# Patient Record
Sex: FEMALE | Race: WHITE | NOT HISPANIC OR LATINO | URBAN - METROPOLITAN AREA
[De-identification: names, ages, dates, MRNs, and addresses within clinical notes are randomized per-mention and may not be internally consistent; named-entity substitution may affect disease eponyms.]

---

## 2023-09-05 PROBLEM — N94.6 DYSMENORRHEA: Status: ACTIVE | Noted: 2023-09-05

## 2023-09-05 PROBLEM — N92.0 MENORRHAGIA: Status: ACTIVE | Noted: 2023-09-05

## 2023-09-05 PROBLEM — F41.9 ANXIETY: Status: ACTIVE | Noted: 2023-09-05

## 2023-09-05 PROBLEM — R06.00 DYSPNEA: Status: ACTIVE | Noted: 2023-09-05

## 2023-09-05 PROBLEM — F90.9 ADULT ATTENTION DEFICIT HYPERACTIVITY DISORDER: Status: ACTIVE | Noted: 2023-09-05

## 2023-09-05 RX ORDER — FLUOXETINE HYDROCHLORIDE 40 MG/1
CAPSULE ORAL
COMMUNITY

## 2023-09-05 RX ORDER — DEXTROAMPHETAMINE SACCHARATE, AMPHETAMINE ASPARTATE MONOHYDRATE, DEXTROAMPHETAMINE SULFATE AND AMPHETAMINE SULFATE 6.25; 6.25; 6.25; 6.25 MG/1; MG/1; MG/1; MG/1
CAPSULE, EXTENDED RELEASE ORAL
COMMUNITY
End: 2023-11-28 | Stop reason: SDUPTHER

## 2023-09-05 RX ORDER — AZITHROMYCIN 250 MG/1
TABLET, FILM COATED ORAL
COMMUNITY
End: 2023-10-23 | Stop reason: ALTCHOICE

## 2023-09-05 RX ORDER — TRAZODONE HYDROCHLORIDE 50 MG/1
TABLET ORAL
COMMUNITY
End: 2024-01-24

## 2023-09-05 RX ORDER — QUETIAPINE 150 MG/1
TABLET, FILM COATED, EXTENDED RELEASE ORAL
COMMUNITY
End: 2024-04-04 | Stop reason: ALTCHOICE

## 2023-09-05 RX ORDER — LAMOTRIGINE 100 MG/1
TABLET ORAL
COMMUNITY
End: 2024-01-24

## 2023-09-05 RX ORDER — ALPRAZOLAM 0.5 MG/1
TABLET ORAL
COMMUNITY
End: 2024-04-04 | Stop reason: ALTCHOICE

## 2023-09-05 RX ORDER — AMOXICILLIN AND CLAVULANATE POTASSIUM 875; 125 MG/1; MG/1
TABLET, FILM COATED ORAL
COMMUNITY
End: 2023-10-23 | Stop reason: ALTCHOICE

## 2023-10-23 ENCOUNTER — TELEMEDICINE (OUTPATIENT)
Dept: PRIMARY CARE | Facility: CLINIC | Age: 53
End: 2023-10-23
Payer: COMMERCIAL

## 2023-10-23 ENCOUNTER — TELEPHONE (OUTPATIENT)
Dept: PRIMARY CARE | Facility: CLINIC | Age: 53
End: 2023-10-23

## 2023-10-23 DIAGNOSIS — U07.1 COVID-19: Primary | ICD-10-CM

## 2023-10-23 PROCEDURE — 99213 OFFICE O/P EST LOW 20 MIN: CPT | Performed by: FAMILY MEDICINE

## 2023-10-23 RX ORDER — EPINEPHRINE 0.3 MG/.3ML
1 INJECTION INTRAMUSCULAR ONCE AS NEEDED
COMMUNITY
Start: 2023-06-02

## 2023-10-23 RX ORDER — NIRMATRELVIR AND RITONAVIR 300-100 MG
3 KIT ORAL 2 TIMES DAILY
Qty: 30 TABLET | Refills: 0 | Status: SHIPPED | OUTPATIENT
Start: 2023-10-23 | End: 2023-10-28

## 2023-10-23 ASSESSMENT — ENCOUNTER SYMPTOMS
COUGH: 1
HEADACHES: 1
WHEEZING: 1
SINUS PAIN: 1

## 2023-10-23 NOTE — TELEPHONE ENCOUNTER
Ruth called as she test + for COVID this morning. She was sent the [employer] link to set up for possible virtual appt.

## 2023-10-23 NOTE — TELEPHONE ENCOUNTER
Ruth called back and agrees to the end of day for telev. She did say she has concerns with the chest tightness as she had history of blood clots. She did say that she is prone to having respiratory infections as well.

## 2023-10-23 NOTE — LETTER
Date: 2023  RE:  Ruth Gonzalez  :  1970      To Whom It May Concern:    Our patient, Ruth, has been under our care and now may return back to work without restrictions.    Their return to work date is:  ***    If you have questions concerning this patient's immediate care, please feel free to contact our office at [phone]    Sincerely,      Huseyin Santos DO

## 2023-10-23 NOTE — PROGRESS NOTES
Subjective   Patient ID: Ruth Gonzalez is a 52 y.o. adult who presents for Covid-19 Home Monitoring Visit.    URI   This is a new problem. The current episode started yesterday. The problem has been gradually worsening (Tested positive for COVID this am). Maximum temperature: Feverish. Associated symptoms include congestion, coughing, headaches, sinus pain and wheezing. Ruth Gonzalez has tried inhaler use and acetaminophen for the symptoms. The treatment provided mild relief.        Review of Systems   HENT:  Positive for congestion and sinus pain.    Respiratory:  Positive for cough and wheezing.    Neurological:  Positive for headaches.       Objective   There were no vitals taken for this visit.    Physical Exam  Vitals reviewed: No physical - telehealth visit.         Assessment/Plan   Diagnoses and all orders for this visit:  COVID-19  -     nirmatrelvir-ritonavir (Paxlovid) 300 mg (150 mg x 2)-100 mg tablet therapy pack; Take 3 tablets by mouth 2 times a day for 5 days. Follow the instructions on the package

## 2023-10-24 ENCOUNTER — APPOINTMENT (OUTPATIENT)
Dept: RADIOLOGY | Facility: HOSPITAL | Age: 53
End: 2023-10-24
Payer: COMMERCIAL

## 2023-10-24 ENCOUNTER — HOSPITAL ENCOUNTER (EMERGENCY)
Facility: HOSPITAL | Age: 53
Discharge: HOME | End: 2023-10-24
Attending: EMERGENCY MEDICINE
Payer: COMMERCIAL

## 2023-10-24 ENCOUNTER — TELEPHONE (OUTPATIENT)
Dept: PRIMARY CARE | Facility: CLINIC | Age: 53
End: 2023-10-24
Payer: COMMERCIAL

## 2023-10-24 VITALS
WEIGHT: 190.7 LBS | DIASTOLIC BLOOD PRESSURE: 76 MMHG | OXYGEN SATURATION: 98 % | TEMPERATURE: 98.2 F | BODY MASS INDEX: 28.24 KG/M2 | HEIGHT: 69 IN | SYSTOLIC BLOOD PRESSURE: 125 MMHG | HEART RATE: 85 BPM | RESPIRATION RATE: 15 BRPM

## 2023-10-24 DIAGNOSIS — U07.1 COVID-19 VIRUS INFECTION: Primary | ICD-10-CM

## 2023-10-24 LAB
ALBUMIN SERPL-MCNC: 4.2 G/DL (ref 3.5–5)
ALP BLD-CCNC: 118 U/L (ref 35–125)
ALT SERPL-CCNC: 21 U/L (ref 5–40)
ANION GAP SERPL CALC-SCNC: 14 MMOL/L
APPEARANCE UR: CLEAR
AST SERPL-CCNC: 28 U/L (ref 5–40)
BACTERIA #/AREA URNS AUTO: ABNORMAL /HPF
BILIRUB DIRECT SERPL-MCNC: <0.2 MG/DL (ref 0–0.2)
BILIRUB SERPL-MCNC: 0.2 MG/DL (ref 0.1–1.2)
BILIRUB UR STRIP.AUTO-MCNC: NEGATIVE MG/DL
BUN SERPL-MCNC: 9 MG/DL (ref 8–25)
CALCIUM SERPL-MCNC: 8.9 MG/DL (ref 8.5–10.4)
CHLORIDE SERPL-SCNC: 100 MMOL/L (ref 97–107)
CO2 SERPL-SCNC: 20 MMOL/L (ref 24–31)
COLOR UR: ABNORMAL
CREAT SERPL-MCNC: 0.8 MG/DL (ref 0.4–1.6)
D DIMER PPP FEU-MCNC: 0.53 MG/L FEU (ref 0.19–0.5)
ERYTHROCYTE [DISTWIDTH] IN BLOOD BY AUTOMATED COUNT: 12.9 % (ref 11.5–14.5)
GFR SERPL CREATININE-BSD FRML MDRD: 90 ML/MIN/1.73M*2
GLUCOSE SERPL-MCNC: 108 MG/DL (ref 65–99)
GLUCOSE UR STRIP.AUTO-MCNC: NORMAL MG/DL
HCT VFR BLD AUTO: 39.1 % (ref 36–52)
HGB BLD-MCNC: 13.7 G/DL (ref 12–17.5)
KETONES UR STRIP.AUTO-MCNC: NEGATIVE MG/DL
LEUKOCYTE ESTERASE UR QL STRIP.AUTO: NEGATIVE
MCH RBC QN AUTO: 32.4 PG (ref 26–34)
MCHC RBC AUTO-ENTMCNC: 35 G/DL (ref 32–36)
MCV RBC AUTO: 92 FL (ref 80–100)
NITRITE UR QL STRIP.AUTO: NEGATIVE
NRBC BLD-RTO: 0 /100 WBCS (ref 0–0)
PH UR STRIP.AUTO: 5 [PH]
PLATELET # BLD AUTO: 275 X10*3/UL (ref 150–450)
PMV BLD AUTO: 10.4 FL (ref 7.5–11.5)
POTASSIUM SERPL-SCNC: 3.8 MMOL/L (ref 3.4–5.1)
PROT SERPL-MCNC: 7.6 G/DL (ref 5.9–7.9)
PROT UR STRIP.AUTO-MCNC: NEGATIVE MG/DL
RBC # BLD AUTO: 4.23 X10*6/UL (ref 4–5.9)
RBC # UR STRIP.AUTO: ABNORMAL /UL
RBC #/AREA URNS AUTO: ABNORMAL /HPF
SODIUM SERPL-SCNC: 134 MMOL/L (ref 133–145)
SP GR UR STRIP.AUTO: 1.01
SQUAMOUS #/AREA URNS AUTO: ABNORMAL /HPF
TROPONIN T SERPL-MCNC: <6 NG/L
UROBILINOGEN UR STRIP.AUTO-MCNC: NORMAL MG/DL
WBC # BLD AUTO: 3.6 X10*3/UL (ref 4.4–11.3)
WBC #/AREA URNS AUTO: ABNORMAL /HPF

## 2023-10-24 PROCEDURE — 2500000004 HC RX 250 GENERAL PHARMACY W/ HCPCS (ALT 636 FOR OP/ED): Performed by: EMERGENCY MEDICINE

## 2023-10-24 PROCEDURE — 81001 URINALYSIS AUTO W/SCOPE: CPT | Performed by: EMERGENCY MEDICINE

## 2023-10-24 PROCEDURE — 85300 ANTITHROMBIN III ACTIVITY: CPT | Performed by: EMERGENCY MEDICINE

## 2023-10-24 PROCEDURE — 96374 THER/PROPH/DIAG INJ IV PUSH: CPT | Mod: XU

## 2023-10-24 PROCEDURE — 82248 BILIRUBIN DIRECT: CPT | Performed by: EMERGENCY MEDICINE

## 2023-10-24 PROCEDURE — 99285 EMERGENCY DEPT VISIT HI MDM: CPT | Mod: 25

## 2023-10-24 PROCEDURE — 71045 X-RAY EXAM CHEST 1 VIEW: CPT | Mod: FY

## 2023-10-24 PROCEDURE — 96375 TX/PRO/DX INJ NEW DRUG ADDON: CPT | Mod: XU

## 2023-10-24 PROCEDURE — 2550000001 HC RX 255 CONTRASTS: Performed by: EMERGENCY MEDICINE

## 2023-10-24 PROCEDURE — 84484 ASSAY OF TROPONIN QUANT: CPT | Performed by: EMERGENCY MEDICINE

## 2023-10-24 PROCEDURE — 36415 COLL VENOUS BLD VENIPUNCTURE: CPT | Performed by: EMERGENCY MEDICINE

## 2023-10-24 PROCEDURE — 80053 COMPREHEN METABOLIC PANEL: CPT | Performed by: EMERGENCY MEDICINE

## 2023-10-24 PROCEDURE — 85027 COMPLETE CBC AUTOMATED: CPT | Performed by: EMERGENCY MEDICINE

## 2023-10-24 PROCEDURE — 71275 CT ANGIOGRAPHY CHEST: CPT

## 2023-10-24 RX ORDER — ONDANSETRON HYDROCHLORIDE 2 MG/ML
4 INJECTION, SOLUTION INTRAVENOUS EVERY 6 HOURS PRN
Status: COMPLETED | OUTPATIENT
Start: 2023-10-24 | End: 2023-10-24

## 2023-10-24 RX ORDER — ONDANSETRON 4 MG/1
4 TABLET, FILM COATED ORAL EVERY 6 HOURS
Qty: 12 TABLET | Refills: 0 | Status: SHIPPED | OUTPATIENT
Start: 2023-10-24 | End: 2023-10-27

## 2023-10-24 RX ORDER — KETOROLAC TROMETHAMINE 30 MG/ML
15 INJECTION, SOLUTION INTRAMUSCULAR; INTRAVENOUS ONCE
Status: COMPLETED | OUTPATIENT
Start: 2023-10-24 | End: 2023-10-24

## 2023-10-24 RX ORDER — BENZONATATE 100 MG/1
100 CAPSULE ORAL EVERY 8 HOURS
Qty: 21 CAPSULE | Refills: 0 | Status: SHIPPED | OUTPATIENT
Start: 2023-10-24 | End: 2023-10-31

## 2023-10-24 RX ORDER — ALBUTEROL SULFATE 90 UG/1
1-2 AEROSOL, METERED RESPIRATORY (INHALATION) EVERY 6 HOURS PRN
Qty: 18 G | Refills: 0 | Status: SHIPPED | OUTPATIENT
Start: 2023-10-24 | End: 2023-12-29 | Stop reason: WASHOUT

## 2023-10-24 RX ADMIN — SODIUM CHLORIDE 1000 ML: 900 INJECTION, SOLUTION INTRAVENOUS at 14:59

## 2023-10-24 RX ADMIN — KETOROLAC TROMETHAMINE 15 MG: 30 INJECTION, SOLUTION INTRAMUSCULAR at 15:04

## 2023-10-24 RX ADMIN — ONDANSETRON 4 MG: 2 INJECTION INTRAMUSCULAR; INTRAVENOUS at 15:02

## 2023-10-24 RX ADMIN — IOHEXOL 75 ML: 350 INJECTION, SOLUTION INTRAVENOUS at 17:02

## 2023-10-24 ASSESSMENT — PAIN SCALES - GENERAL
PAINLEVEL_OUTOF10: 7
PAINLEVEL_OUTOF10: 7

## 2023-10-24 ASSESSMENT — PAIN - FUNCTIONAL ASSESSMENT: PAIN_FUNCTIONAL_ASSESSMENT: 0-10

## 2023-10-24 ASSESSMENT — COLUMBIA-SUICIDE SEVERITY RATING SCALE - C-SSRS
2. HAVE YOU ACTUALLY HAD ANY THOUGHTS OF KILLING YOURSELF?: NO
6. HAVE YOU EVER DONE ANYTHING, STARTED TO DO ANYTHING, OR PREPARED TO DO ANYTHING TO END YOUR LIFE?: NO
1. IN THE PAST MONTH, HAVE YOU WISHED YOU WERE DEAD OR WISHED YOU COULD GO TO SLEEP AND NOT WAKE UP?: NO

## 2023-10-24 NOTE — ED PROVIDER NOTES
EMERGENCY DEPARTMENT ENCOUNTER      [ ] CODE STEMI [ ] CODE Neuro [ ] CODE Yellow [ ] Modified Trauma [ ] CODE Blue      CHIEF COMPLAINT      Chief Complaint   Patient presents with    Shortness of Breath     Pt states she tested positive for covid yesterday.  Has been feeling sob, chest stabbing pain and cough starting Saturday.  States hx of PE.        Mode of Arrival:  Primary Care Provider: Huseyin Santos DO  Medical Record Number: [id number]      History obtained by: Patient  Limited by nothing  Time seen: 3:55 PM    QUALITY MEASURES   PPE Utilized: N95 with goggles and gloves      HPI      Ruth Gonzalez is a 52 y.o. adult with a history of COVID vaccination x2, anxiety, depression, prior DVT for which she was on Eliquis for 3 months about 2 years ago unknown cause, comes to the emergency room stating that about 3 days ago she started to experience generalized symptoms of cough sore throat fatigue malaise low appetite and cough as well as chills so went to her primary care doctor yesterday where she received Paxlovid which she started taking.  However felt some mild left lower chest aches as well as left lower abdominal aches and was concerned as she states her prior PE had felt like this so wanted to get checked out as a precaution.  Otherwise has taken Tylenol with some fever relief no other medications taken.  No other complaints.      Patient otherwise denies fever,   n/v/d, shortness of breath,   rhinorrhea,  dysuria, hematuria, swollen extremities or skin changes, hematemesis, hematochezia, melena or any other accompanying symptoms of late.      The patient has no other complaints at this time.               PAST MEDICAL HISTORY    History reviewed. No pertinent past medical history.      I have personally reviewed the patient's past medical history in the records.  Kristofer Rome MD    SURGICAL HISTORY    Past Surgical History:   Procedure Laterality Date    BREAST SURGERY      HYSTERECTOMY      WISDOM  TOOTH EXTRACTION         I have personally reviewed the patient's past surgical history in the records.  Kristofer Rome MD    CURRENT MEDICATIONS    I have reviewed the patient’s medications.   Please see nursing and pharmacy records for the most up to date list.     [unfilled]    ALLERGIES    Allergies   Allergen Reactions    Other Other     Zolpidem Tartrate; complex sleep behaviors     Zolpidem Tartrate Other       I have personally reviewed the patient's past history of allergies in the records.  Kristofer Rome MD    FAMILY HISTORY    Family History   Problem Relation Name Age of Onset    Hypertension Mother      Other (RAD) Mother      Diabetes Mother      Depression Mother      Asthma Mother      Mental illness Mother      No Known Problems Son      Heart disease Maternal Grandmother      Stroke Maternal Grandmother      Diabetes Maternal Grandmother      Diabetes Maternal Grandfather      Breast cancer Paternal Grandmother      Cancer Paternal Grandmother      Cancer Paternal Grandfather      Lymphoma Paternal Grandfather         I have personally reviewed the patient's family history in the records.  Kristofer Rome MD    SOCIAL HISTORY    Social History     Socioeconomic History    Marital status:      Spouse name: None    Number of children: None    Years of education: None    Highest education level: None   Occupational History    None   Tobacco Use    Smoking status: Never    Smokeless tobacco: Never   Substance and Sexual Activity    Alcohol use: None    Drug use: None    Sexual activity: None   Other Topics Concern    None   Social History Narrative    None     Social Determinants of Health     Financial Resource Strain: Not on file   Food Insecurity: Not on file   Transportation Needs: Not on file   Physical Activity: Not on file   Stress: Not on file   Social Connections: Not on file   Intimate Partner Violence: Not on file   Housing Stability: Not on file         I have personally reviewed the  "patient's social history in the records.  Kristofer Rome MD    REVIEW OF SYSTEMS      14 point ROS was reviewed and negative except as noted above in HPI.      PHYSICAL EXAM    VITAL SIGNS:    /89 (BP Location: Left arm, Patient Position: Lying)   Pulse 81   Temp 37.3 °C (99.1 °F)   Resp 15   Ht 1.753 m (5' 9\")   Wt 86.5 kg (190 lb 11.2 oz)   SpO2 95%   BMI 28.16 kg/m²    Review EMR for vital signs  Nursing note and vitals reviewed.    Constitutional:  Alert and oriented, well-developed, well-nourished, appears stated age, non-toxic appearing  HENT:  Normocephalic, atraumatic, bilateral external ears normal, oropharynx moist, Nose normal.  Neck: normal range of motion, no tenderness, supple, no stridor.  Eyes:  PERRL, EOMI, conjunctiva normal, no discharge.   Cardiovascular:  Normal heart rate, normal rhythm, no murmurs, no rubs, no gallops.   Respiratory:  Normal breath sounds, no respiratory distress, no wheezing, no chest wall tenderness.   GI:  Bowel sounds normal, soft, no tenderness, no rebound or guarding, no distention, no masses pulsatile or otherwise   (any female  exam was done with female chaperone present):   Deferred  Integument:  Warm, dry, no erythema, no rash, no edema.   Back:  No midline tenderness, no CVA tenderness.   Musculoskeletal:  Intact distal pulses, no tenderness, no cyanosis, no clubbing, with capillary refill less than 2 seconds. Good range of motion in all major joints. No tenderness to palpation or major deformities noted.    Neurologic:  Alert & oriented x 3, normal motor function, normal sensory function, no focal deficits noted. Cranial nerves II-XII intact.  Psychiatric:  Affect normal, judgment normal, mood normal.     EKG  Performed at   1448 , HR of  98 ,     NSR, NAD, no sign of STEMI or NSTEMI, no Q wave or T wave abnormality noted.    Reviewed and interpreted by me at time performed           Reviewed and interpreted by me, Kristofer Rome MD        CARDIAC " MONITORING    Cardiac monitor reveals normal sinus rhythm as interpreted by me.   Cardiac monitor was ordered secondary to patient's history of chest pain/palpitations/near-syncope/syncope/sob/stroke and to monitor the patient for dysrhythmia.      RADIOLOGY  XR chest 1 view   Final Result   No evidence of acute cardiopulmonary process.             MACRO:   None        Signed by: Lawrence Heckfreddy 10/24/2023 3:26 PM   Dictation workstation:   [id number]      CT angio chest for pulmonary embolism    (Results Pending)       All Imaging studies evaluated and interpreted by ED physician except when noted otherwise.    ED PROVIDER INTERPRETATION (XRAYS ONLY):       *I have interpreted the x-ray real-time in the ED myself, and made a clinical decision on it prior to the formal radiology reading.    Kristofer Rome M.D.    RADIOLOGIST IMPRESSION (U/S, CT, MRI):   XR chest 1 view   Final Result   No evidence of acute cardiopulmonary process.             MACRO:   None        Signed by: Lawrence Anyu 10/24/2023 3:26 PM   Dictation workstation:   [id number]      CT angio chest for pulmonary embolism    (Results Pending)         PERTINENT LABS    Please refer to the chart for all lab work and to MDM for relevant discussion.      PROCEDURE    None (procdoc)    ED COURSE & MEDICAL DECISION MAKING    Pertinent Labs & Imaging studies reviewed. (See chart for details)    MDM:    Assessment: Ruth Gonzalez is a 52 y.o.adult who presents to the ED with suspected sequelae of COVID infection and told patient that for now I would give a combination of Toradol Zofran and IV fluid for her relief but also obtain a basic cardiac work-up CBC chemistry troponin chest x-ray LFTs also check a urinalysis given the patient has some self described left lower quadrant pain although there is no tenderness on exam suspect that this is simply muscle aches.  However also had a D-dimer per patient's concern and low threshold for obtaining a CT angio of the chest  "given her prior PE.  Patient understands and agrees with plan.  Not currently on blood thinners        Prior records in EPIC reviewed by me.     2023 Coding Requirements:  --Independent historian(s):    see HPI  --Review of prior records:    EHR reviewed   --Relevant comorbidities:    see records  --Social determinants of health:          I have considered the following conditions in my assessment of this patient's   cough: Bronchitis, bronchiolitis, pneumonia, bronchospasm/asthma, croup,   pertussis, COPD, irritant cough (ie.  post-nasal drip, fume inhalation, allergens),   medication side effect (ie ACE inhibitors), viral upper respiratory infection,   influenza, pulmonary embolism, congestive heart failure, pulmonary edema,   GERD, foreign body aspiration/obstruction, malignancy.      Ultimately CBC shows slightly low white count consistent with COVID infection.  CBC chemistry LFTs unremarkable with troponin negative and UA only showing bacteria but no other sign of infection.  However D-dimer borderline elevated so decision made to obtain a CT angio of the chest despite normal chest x-ray.  Patient understands and agrees with plan and otherwise feels better    CT angio of the chest did not show any acute findings aside from stable lung nodules compared to prior and after discussion patient will provide albuterol Zofran and Tessalon to additionally help with COVID infection.  Patient understands and agrees with plan        ED VITALS  Vitals:    10/24/23 1418 10/24/23 1547   BP: 139/52 113/89   BP Location:  Left arm   Patient Position:  Lying   Pulse: 104 81   Resp: 20 15   Temp: 37.3 °C (99.1 °F)    SpO2: 98% 95%   Weight: 86.5 kg (190 lb 11.2 oz)    Height: 1.753 m (5' 9\")        BP  Min: 113/89  Max: 139/52    As part of the 2022 MIPS reporting requirements, the following measures have been reviewed and documented:    None     1. COVID-19 virus infection          DISPOSITION       DISCHARGE.  The patient is " discharged back to their place of residence.  Discharge diagnosis, instructions and plan were discussed and understood. At the time of discharge the patient was comfortable and was in no apparent distress. Patient is aware of diagnostic uncertainty and was notified though testing is negative here, there is a very small chance that pathology may be missed.  The patient understands these risks and the patient /family understood to return immediately to the emergency department if the symptoms worsen or if they have any additional concerns.    DISCHARGE MEDICATIONS  New Prescriptions    No medications on file       FOLLOW UP  No follow-up provider specified.    Kristofer Rome    This note was created with the assistance of voice recognition technology.  While attempts were made to ensure accuracy, mis-transcription may be present due to limitations in the software.        Electronically signed by MD Kristofer Dos Santos MD  10/24/23 9595

## 2023-10-24 NOTE — DISCHARGE INSTRUCTIONS
Use ibuprofen and Tylenol for any fever headaches muscle aches Tessalon to help with any cough and sore throat albuterol to also help with cough and Zofran can be used for any nausea.  If your symptoms worsen spite medication use and Paxlovid, you may come back to the emergency room for repeat evaluation.

## 2023-10-24 NOTE — LETTER
October 24, 2023    Patient: Ruth Gonzalez   YOB: 1970   Date of Visit: 10/24/2023       To Whom It May Concern:    Ruth Gonzalez was seen and treated in our emergency department on 10/24/2023. Ruth Gonzalez may come back to work on October 27, 2023    If you have any questions or concerns, please don't hesitate to call.      Kristofer Rome MD

## 2023-10-24 NOTE — TELEPHONE ENCOUNTER
Pt is calling back to follow up with. She states she's COVID + positve 10/23/23 (home test kit). She is still getting the stabbing pain in her chest and L side. She used the heating pad last night it has not done anything. She now has throbbing soreness in her both legs. When she inhales she gets the pain in her back and side. The Albuterol inhaler ran out last night. She's getting SOB. Fever 100.4, pulse ox on her apple watch is 94 % not shadia how accurate these are. OTC: Tylenol, She's working from home and did talk to work and she'd have to use FMLA, since she exhausted all her sick days. Please advise and call her [phone].

## 2023-10-25 ENCOUNTER — TELEPHONE (OUTPATIENT)
Dept: PRIMARY CARE | Facility: CLINIC | Age: 53
End: 2023-10-25
Payer: COMMERCIAL

## 2023-10-25 NOTE — TELEPHONE ENCOUNTER
Spoke with patient and she states that she would like to have FMLA forms filled out for her COVID + and her trip to the ER for the blood clots. She states that she has no more sick days at work and they are telling her that she could come back now and just wear a mask. She states that she is currently taking the paxlovid and would like to have her FMLA filled out since they are giving her a hard time. She states that they no longer have any COVID protocols in place. She works at [employer] and she is going to have them fax the forms to you. She states taht she also had her vacation already planned before getting COVID and she would like to know if he RTW date could be 11/06/23. She is afraid that they will not allow her to have her vacation since being off for her COVID. Please call her back with any questions at [phone]. PL

## 2023-10-27 NOTE — TELEPHONE ENCOUNTER
Pt is calling back to follow up on the receipt of her [facility] paperwork. Please call [phone].

## 2023-10-27 NOTE — TELEPHONE ENCOUNTER
Called patient and let her know that we did not receive paperwork. Explained that we had been having fax issues. She is going to have it refaxed.

## 2023-10-30 NOTE — TELEPHONE ENCOUNTER
Ruth called to check on her [facility] papers, and we still have not received them, so she will be bringing them up for Dr Santos to fill out next week.

## 2023-11-01 ENCOUNTER — HOSPITAL ENCOUNTER (OUTPATIENT)
Dept: CARDIOLOGY | Facility: HOSPITAL | Age: 53
Discharge: HOME | End: 2023-11-01
Payer: COMMERCIAL

## 2023-11-01 PROCEDURE — 93005 ELECTROCARDIOGRAM TRACING: CPT

## 2023-11-01 NOTE — TELEPHONE ENCOUNTER
Ruth stopped in the office and dropped off her [place] paperwork for [facility]. I thoroughly explained Dr. Santos is out of office this week and if needing any signatures from him or statements he would not be able to until he gets back from vacation. Patient brought back in additional labs and notes she had at home. ER note sent with labs and recent Telemedicine note. I advised patient in detail I was unsure if [facility] would accept these but I would fax to them today. Patient is visibly upset due to work, paycheck and time off that is needed. I again advised her that we would send what we could without Dr. Santos being here and let her know of any outcome. Faxed to [facility] [phone] successful fax confirmation.

## 2023-11-01 NOTE — TELEPHONE ENCOUNTER
Ruth called this morning asking on the status of the FMLA forms. She was asked/ informed that it was noted she was to drop them off next week. She stated that is not what she said, as she was COVID + and could not leave. She had took  her last dose of Paxlovid on Monday 10-30. She is still testing positive. She was offered my work e-mail to have her employer to send it over being it was not received by fax.

## 2023-11-06 ENCOUNTER — TELEMEDICINE (OUTPATIENT)
Dept: PRIMARY CARE | Facility: CLINIC | Age: 53
End: 2023-11-06
Payer: COMMERCIAL

## 2023-11-06 DIAGNOSIS — J20.9 ACUTE BRONCHITIS, UNSPECIFIED ORGANISM: Primary | ICD-10-CM

## 2023-11-06 PROCEDURE — 99213 OFFICE O/P EST LOW 20 MIN: CPT | Performed by: FAMILY MEDICINE

## 2023-11-06 RX ORDER — PROMETHAZINE HYDROCHLORIDE AND DEXTROMETHORPHAN HYDROBROMIDE 6.25; 15 MG/5ML; MG/5ML
5 SYRUP ORAL EVERY 4 HOURS PRN
Qty: 120 ML | Refills: 0 | Status: SHIPPED | OUTPATIENT
Start: 2023-11-06 | End: 2023-12-06

## 2023-11-06 RX ORDER — PREDNISONE 10 MG/1
TABLET ORAL
Qty: 21 TABLET | Refills: 0 | Status: SHIPPED | OUTPATIENT
Start: 2023-11-06 | End: 2023-11-15

## 2023-11-06 RX ORDER — AMOXICILLIN AND CLAVULANATE POTASSIUM 875; 125 MG/1; MG/1
875 TABLET, FILM COATED ORAL 2 TIMES DAILY
Qty: 20 TABLET | Refills: 0 | Status: SHIPPED | OUTPATIENT
Start: 2023-11-06 | End: 2023-11-16

## 2023-11-06 NOTE — PROGRESS NOTES
Subjective   Patient ID: Ruth Gonzalez is a 52 y.o. adult who presents for Follow up COVID.    Spoke to patient via telehealth.  Pt has covid.  Went to ER - negative blood work.  Neg CT for PE or pneumonia.      Still fatigued, coughing, low grade fever (100 degrees).  Pt does get SOB with walking, exertion.  Pt has albuterol.  Using tessalon perles.     Pt needs paperwork for FMLA.           Review of Systems    Objective   There were no vitals taken for this visit.    Physical Exam  Vitals reviewed: Telehealth visit.         Assessment/Plan   Diagnoses and all orders for this visit:  Acute bronchitis, unspecified organism  -     predniSONE (Deltasone) 10 mg tablet; Take 4 tablets (40 mg) by mouth once daily for 3 days, THEN 2 tablets (20 mg) once daily for 3 days, THEN 1 tablet (10 mg) once daily for 3 days.  -     amoxicillin-pot clavulanate (Augmentin) 875-125 mg tablet; Take 1 tablet (875 mg) by mouth 2 times a day for 10 days.  -     promethazine-DM (Phenergan-DM) 6.25-15 mg/5 mL syrup; Take 5 mL by mouth every 4 hours if needed for cough.

## 2023-11-07 ENCOUNTER — TELEPHONE (OUTPATIENT)
Dept: PRIMARY CARE | Facility: CLINIC | Age: 53
End: 2023-11-07
Payer: COMMERCIAL

## 2023-11-07 NOTE — TELEPHONE ENCOUNTER
Pt called again, and said she needs the paperwork by the 9th, and would like a call with an update!

## 2023-11-09 ENCOUNTER — TELEPHONE (OUTPATIENT)
Dept: PRIMARY CARE | Facility: CLINIC | Age: 53
End: 2023-11-09
Payer: COMMERCIAL

## 2023-11-27 ENCOUNTER — PATIENT MESSAGE (OUTPATIENT)
Dept: PRIMARY CARE | Facility: CLINIC | Age: 53
End: 2023-11-27
Payer: COMMERCIAL

## 2023-11-27 DIAGNOSIS — F90.9 ADULT ATTENTION DEFICIT HYPERACTIVITY DISORDER: Primary | ICD-10-CM

## 2023-11-27 DIAGNOSIS — F41.9 ANXIETY: Primary | ICD-10-CM

## 2023-11-27 RX ORDER — QUETIAPINE FUMARATE 100 MG/1
100 TABLET, FILM COATED ORAL NIGHTLY
Qty: 90 TABLET | Refills: 1 | Status: SHIPPED | OUTPATIENT
Start: 2023-11-27 | End: 2024-05-30

## 2023-11-28 RX ORDER — DEXTROAMPHETAMINE SACCHARATE, AMPHETAMINE ASPARTATE MONOHYDRATE, DEXTROAMPHETAMINE SULFATE AND AMPHETAMINE SULFATE 6.25; 6.25; 6.25; 6.25 MG/1; MG/1; MG/1; MG/1
25 CAPSULE, EXTENDED RELEASE ORAL EVERY MORNING
Qty: 30 CAPSULE | Refills: 0 | Status: SHIPPED | OUTPATIENT
Start: 2023-11-28 | End: 2023-12-29 | Stop reason: SDUPTHER

## 2023-12-28 PROBLEM — N92.0 MENORRHAGIA: Status: RESOLVED | Noted: 2023-09-05 | Resolved: 2023-12-28

## 2023-12-28 PROBLEM — R06.00 DYSPNEA: Status: RESOLVED | Noted: 2023-09-05 | Resolved: 2023-12-28

## 2023-12-28 PROBLEM — N94.6 DYSMENORRHEA: Status: RESOLVED | Noted: 2023-09-05 | Resolved: 2023-12-28

## 2023-12-29 ENCOUNTER — OFFICE VISIT (OUTPATIENT)
Dept: PRIMARY CARE | Facility: CLINIC | Age: 53
End: 2023-12-29
Payer: COMMERCIAL

## 2023-12-29 VITALS
DIASTOLIC BLOOD PRESSURE: 72 MMHG | SYSTOLIC BLOOD PRESSURE: 126 MMHG | BODY MASS INDEX: 29.03 KG/M2 | HEIGHT: 69 IN | TEMPERATURE: 97.5 F | HEART RATE: 72 BPM | OXYGEN SATURATION: 98 % | RESPIRATION RATE: 18 BRPM | WEIGHT: 196 LBS

## 2023-12-29 DIAGNOSIS — F90.9 ADULT ATTENTION DEFICIT HYPERACTIVITY DISORDER: ICD-10-CM

## 2023-12-29 DIAGNOSIS — F41.9 ANXIETY: ICD-10-CM

## 2023-12-29 DIAGNOSIS — Z00.00 ROUTINE GENERAL MEDICAL EXAMINATION AT A HEALTH CARE FACILITY: Primary | ICD-10-CM

## 2023-12-29 DIAGNOSIS — R05.8 POST-VIRAL COUGH SYNDROME: ICD-10-CM

## 2023-12-29 DIAGNOSIS — Z12.31 VISIT FOR SCREENING MAMMOGRAM: ICD-10-CM

## 2023-12-29 DIAGNOSIS — Z12.11 SCREEN FOR COLON CANCER: ICD-10-CM

## 2023-12-29 PROCEDURE — 99396 PREV VISIT EST AGE 40-64: CPT | Performed by: FAMILY MEDICINE

## 2023-12-29 PROCEDURE — 1036F TOBACCO NON-USER: CPT | Performed by: FAMILY MEDICINE

## 2023-12-29 RX ORDER — FLUTICASONE FUROATE AND VILANTEROL 100; 25 UG/1; UG/1
1 POWDER RESPIRATORY (INHALATION) DAILY
Qty: 1 EACH | Refills: 1 | Status: SHIPPED | OUTPATIENT
Start: 2023-12-29

## 2023-12-29 RX ORDER — DEXTROAMPHETAMINE SACCHARATE, AMPHETAMINE ASPARTATE MONOHYDRATE, DEXTROAMPHETAMINE SULFATE AND AMPHETAMINE SULFATE 6.25; 6.25; 6.25; 6.25 MG/1; MG/1; MG/1; MG/1
25 CAPSULE, EXTENDED RELEASE ORAL EVERY MORNING
Qty: 30 CAPSULE | Refills: 0 | Status: SHIPPED | OUTPATIENT
Start: 2023-12-29 | End: 2024-02-26 | Stop reason: SDUPTHER

## 2023-12-29 ASSESSMENT — COLUMBIA-SUICIDE SEVERITY RATING SCALE - C-SSRS
6. HAVE YOU EVER DONE ANYTHING, STARTED TO DO ANYTHING, OR PREPARED TO DO ANYTHING TO END YOUR LIFE?: NO
1. IN THE PAST MONTH, HAVE YOU WISHED YOU WERE DEAD OR WISHED YOU COULD GO TO SLEEP AND NOT WAKE UP?: NO
2. HAVE YOU ACTUALLY HAD ANY THOUGHTS OF KILLING YOURSELF?: NO

## 2023-12-29 ASSESSMENT — PATIENT HEALTH QUESTIONNAIRE - PHQ9
2. FEELING DOWN, DEPRESSED OR HOPELESS: NOT AT ALL
SUM OF ALL RESPONSES TO PHQ9 QUESTIONS 1 AND 2: 0
1. LITTLE INTEREST OR PLEASURE IN DOING THINGS: NOT AT ALL

## 2023-12-29 ASSESSMENT — ENCOUNTER SYMPTOMS
OCCASIONAL FEELINGS OF UNSTEADINESS: 0
DEPRESSION: 0
LOSS OF SENSATION IN FEET: 0

## 2023-12-29 NOTE — PATIENT INSTRUCTIONS
For physical - check blood work.  Order for cologuard and mammogram.     For ADHD - refill adderall xr.  Sent to [facility].      For cough - recommend breo.  Likely post viral cough from covid.  Use once a day - rinse mouth after.     For anxiety - stable on current regimen.     Follow up in 3 months.

## 2023-12-29 NOTE — PROGRESS NOTES
"Subjective   Patient ID: Ruth Gonzalez is a 53 y.o. adult who presents for Annual Exam.    ADHD:  -F/U: stable.  Medication working well.  Tolerating well  -Side Effects: none  -Past medications:   -Other:    Anxiety  -F/U: having lots of stress at work.    -Symptoms have been stable    -She denies current suicidal and homicidal ideation.    -Current Treatment:  -Counseling:  -Previous treatment includes:     Cough  -Pt had Covid - still has cough.  Not improving.  Tried rescue inhaler - no benefit.            Review of Systems    Objective   /72   Pulse 72   Temp 36.4 °C (97.5 °F)   Resp 18   Ht 1.753 m (5' 9\")   Wt 88.9 kg (196 lb)   SpO2 98%   BMI 28.94 kg/m²     Physical Exam  Vitals reviewed.   Constitutional:       General: Ruth Gonzalez is not in acute distress.  Cardiovascular:      Rate and Rhythm: Normal rate and regular rhythm.   Pulmonary:      Effort: Pulmonary effort is normal.      Breath sounds: No wheezing or rhonchi.   Musculoskeletal:      Right lower leg: No edema.      Left lower leg: No edema.   Lymphadenopathy:      Cervical: No cervical adenopathy.   Neurological:      Mental Status: Ruth Gonzalez is alert.       An OARRS report was printed and I have personally reviewed the results.  The report will be scanned into the health record.  I have considered the risk of abuse, dependance, addiction, and diversion.  I believe it is clinically appropriate for this patient to continue taking this medication.      Assessment/Plan   Diagnoses and all orders for this visit:  Routine general medical examination at a health care facility  Adult attention deficit hyperactivity disorder  Anxiety    Patient Instructions   For physical - check blood work.  Order for cologuard and mammogram.     For ADHD - refill adderall xr.  Sent to [facility].      For cough - recommend breo.  Likely post viral cough from covid.  Use once a day - rinse mouth after.     For anxiety - stable on " current regimen.     Follow up in 3 months.

## 2024-01-24 DIAGNOSIS — F32.A ANXIETY AND DEPRESSION: Primary | ICD-10-CM

## 2024-01-24 DIAGNOSIS — Z00.00 ENCOUNTER FOR GENERAL ADULT MEDICAL EXAMINATION WITHOUT ABNORMAL FINDINGS: ICD-10-CM

## 2024-01-24 DIAGNOSIS — F41.9 ANXIETY AND DEPRESSION: Primary | ICD-10-CM

## 2024-01-24 DIAGNOSIS — F51.04 PSYCHOPHYSIOLOGICAL INSOMNIA: Primary | ICD-10-CM

## 2024-01-24 RX ORDER — ESTRADIOL 2 MG/1
2 TABLET ORAL DAILY
Qty: 90 TABLET | Refills: 3 | Status: SHIPPED | OUTPATIENT
Start: 2024-01-24

## 2024-01-24 RX ORDER — QUETIAPINE FUMARATE 50 MG/1
TABLET, FILM COATED ORAL
Qty: 90 TABLET | Refills: 2 | Status: SHIPPED | OUTPATIENT
Start: 2024-01-24

## 2024-01-24 RX ORDER — ALPRAZOLAM 1 MG/1
TABLET ORAL
Qty: 30 TABLET | Refills: 2 | Status: SHIPPED | OUTPATIENT
Start: 2024-01-24

## 2024-01-24 RX ORDER — TRAZODONE HYDROCHLORIDE 50 MG/1
50-100 TABLET ORAL NIGHTLY PRN
Qty: 180 TABLET | Refills: 2 | Status: SHIPPED | OUTPATIENT
Start: 2024-01-24

## 2024-01-24 RX ORDER — LAMOTRIGINE 100 MG/1
TABLET ORAL DAILY
Qty: 90 TABLET | Refills: 2 | Status: SHIPPED | OUTPATIENT
Start: 2024-01-24

## 2024-02-26 DIAGNOSIS — F90.9 ADULT ATTENTION DEFICIT HYPERACTIVITY DISORDER: ICD-10-CM

## 2024-02-26 RX ORDER — DEXTROAMPHETAMINE SACCHARATE, AMPHETAMINE ASPARTATE MONOHYDRATE, DEXTROAMPHETAMINE SULFATE AND AMPHETAMINE SULFATE 6.25; 6.25; 6.25; 6.25 MG/1; MG/1; MG/1; MG/1
25 CAPSULE, EXTENDED RELEASE ORAL EVERY MORNING
Qty: 30 CAPSULE | Refills: 0 | Status: SHIPPED | OUTPATIENT
Start: 2024-02-26 | End: 2024-02-29 | Stop reason: SDUPTHER

## 2024-02-26 NOTE — TELEPHONE ENCOUNTER
Requested Prescriptions     Pending Prescriptions Disp Refills    amphetamine-dextroamphetamine XR (Adderall XR) 25 mg 24 hr capsule 30 capsule 0     Sig: Take 1 capsule (25 mg) by mouth once daily in the morning.

## 2024-02-26 NOTE — TELEPHONE ENCOUNTER
Refill:      amphetamine-dextroamphetamine XR (Adderall XR) 25 mg 24 hr capsule Take 1 capsule (25 mg) by mouth once daily in the morning.             Pharm:  [facility]

## 2024-02-28 NOTE — TELEPHONE ENCOUNTER
Ruth called as GE, Lisa still does not have her refill as the system is still down. She is asking if you could call.

## 2024-02-29 RX ORDER — DEXTROAMPHETAMINE SACCHARATE, AMPHETAMINE ASPARTATE MONOHYDRATE, DEXTROAMPHETAMINE SULFATE AND AMPHETAMINE SULFATE 6.25; 6.25; 6.25; 6.25 MG/1; MG/1; MG/1; MG/1
25 CAPSULE, EXTENDED RELEASE ORAL EVERY MORNING
Qty: 30 CAPSULE | Refills: 0 | Status: SHIPPED | OUTPATIENT
Start: 2024-02-29

## 2024-04-04 ENCOUNTER — OFFICE VISIT (OUTPATIENT)
Dept: PRIMARY CARE | Facility: CLINIC | Age: 54
End: 2024-04-04
Payer: COMMERCIAL

## 2024-04-04 VITALS
HEART RATE: 64 BPM | DIASTOLIC BLOOD PRESSURE: 70 MMHG | OXYGEN SATURATION: 99 % | BODY MASS INDEX: 28.47 KG/M2 | WEIGHT: 192.8 LBS | SYSTOLIC BLOOD PRESSURE: 104 MMHG

## 2024-04-04 DIAGNOSIS — F41.9 ANXIETY: Primary | ICD-10-CM

## 2024-04-04 DIAGNOSIS — F90.9 ADULT ATTENTION DEFICIT HYPERACTIVITY DISORDER: ICD-10-CM

## 2024-04-04 DIAGNOSIS — G44.219 EPISODIC TENSION-TYPE HEADACHE, NOT INTRACTABLE: ICD-10-CM

## 2024-04-04 DIAGNOSIS — R55 VASOVAGAL SYNCOPE: ICD-10-CM

## 2024-04-04 PROCEDURE — 1036F TOBACCO NON-USER: CPT | Performed by: FAMILY MEDICINE

## 2024-04-04 PROCEDURE — 99213 OFFICE O/P EST LOW 20 MIN: CPT | Performed by: FAMILY MEDICINE

## 2024-04-04 RX ORDER — DEXTROAMPHETAMINE SACCHARATE, AMPHETAMINE ASPARTATE MONOHYDRATE, DEXTROAMPHETAMINE SULFATE AND AMPHETAMINE SULFATE 6.25; 6.25; 6.25; 6.25 MG/1; MG/1; MG/1; MG/1
25 CAPSULE, EXTENDED RELEASE ORAL EVERY MORNING
Qty: 30 CAPSULE | Refills: 0 | Status: SHIPPED | OUTPATIENT
Start: 2024-06-03 | End: 2024-07-03

## 2024-04-04 RX ORDER — DEXTROAMPHETAMINE SACCHARATE, AMPHETAMINE ASPARTATE MONOHYDRATE, DEXTROAMPHETAMINE SULFATE AND AMPHETAMINE SULFATE 6.25; 6.25; 6.25; 6.25 MG/1; MG/1; MG/1; MG/1
25 CAPSULE, EXTENDED RELEASE ORAL EVERY MORNING
Qty: 30 CAPSULE | Refills: 0 | Status: SHIPPED | OUTPATIENT
Start: 2024-04-04 | End: 2024-05-04

## 2024-04-04 RX ORDER — DEXTROAMPHETAMINE SACCHARATE, AMPHETAMINE ASPARTATE MONOHYDRATE, DEXTROAMPHETAMINE SULFATE AND AMPHETAMINE SULFATE 6.25; 6.25; 6.25; 6.25 MG/1; MG/1; MG/1; MG/1
25 CAPSULE, EXTENDED RELEASE ORAL EVERY MORNING
Qty: 30 CAPSULE | Refills: 0 | Status: SHIPPED | OUTPATIENT
Start: 2024-05-04 | End: 2024-06-03

## 2024-04-04 ASSESSMENT — PATIENT HEALTH QUESTIONNAIRE - PHQ9
2. FEELING DOWN, DEPRESSED OR HOPELESS: NOT AT ALL
1. LITTLE INTEREST OR PLEASURE IN DOING THINGS: NOT AT ALL
SUM OF ALL RESPONSES TO PHQ9 QUESTIONS 1 & 2: 0

## 2024-04-04 ASSESSMENT — LIFESTYLE VARIABLES
SKIP TO QUESTIONS 9-10: 1
HOW MANY STANDARD DRINKS CONTAINING ALCOHOL DO YOU HAVE ON A TYPICAL DAY: PATIENT DOES NOT DRINK
AUDIT-C TOTAL SCORE: 0
HOW OFTEN DO YOU HAVE A DRINK CONTAINING ALCOHOL: NEVER
HOW OFTEN DO YOU HAVE SIX OR MORE DRINKS ON ONE OCCASION: NEVER

## 2024-04-04 ASSESSMENT — ENCOUNTER SYMPTOMS
DEPRESSION: 0
OCCASIONAL FEELINGS OF UNSTEADINESS: 0
LOSS OF SENSATION IN FEET: 0

## 2024-04-04 ASSESSMENT — PAIN SCALES - GENERAL: PAINLEVEL: 4

## 2024-04-04 NOTE — LETTER
April 4, 2024     Patient: Ruth Gonzalez   YOB: 1970   Date of Visit: 4/4/2024       To Whom It May Concern:    Ruth Gonzalez was seen in my clinic on 4/4/2024 at 10:45 am. Please excuse Ruth for Ruth Gonzalez's absence from work on this day to make the appointment.    If you have any questions or concerns, please don't hesitate to call.         Sincerely,         Huseyin Santos, DO        CC: No Recipients

## 2024-04-04 NOTE — PATIENT INSTRUCTIONS
For ADHD - continue adderall.  Medication contract filled out.     For mood/anxiety - continue lamictal, fluoxetine, and seroquel.     For syncopal episodes - likely vasovagal - if another episode we will do work up.     For tension type headache - if not improved we will do physical therapy/massotherapy or chiropractor manipulation     Follow up in 3 months.

## 2024-04-04 NOTE — PROGRESS NOTES
"Subjective   Patient ID: Ruth Gonzalez is a 53 y.o. adult who presents for Anxiety.    ADHD:  -F/U: stable.  ADHD has been manageable.  Issues with getting adderall - now able to get it.  Does well with adderall.  Tolerating well.    -Side Effects: none  -Past medications:   -Other:    Anxiety  -F/U: Pt has been stressed.  Mother is in hospital.  Causing increased stress.  Work is stressful.    -Symptoms have been stable    -She denies current suicidal and homicidal ideation.    -Current Treatment: lamictal, seroquel, fluoxetine, xanan sparingly  -Counseling: not currently enrolled.   -Previous treatment includes:     Headache:  -Type of headache: Tension Type Headache  -Symptoms: Frontal and occipital.  Worsening.  Pt had episode - pt was finishing bathroom - pt stood up - pt felt \"weird\" and then passed out.  Patient was not confused after episode.  No other episodes  -Frequency:  couple times a week.    -Associated symptoms: light sensitivity, no sound sensitivity  -Treatment: excedrin migraine  -Specialist:  -Past Medications:          Anxiety             Review of Systems    Objective   /70 (BP Location: Right arm, Patient Position: Sitting)   Pulse 64   Wt 87.5 kg (192 lb 12.8 oz)   SpO2 99%   BMI 28.47 kg/m²     Physical Exam  Vitals reviewed.   Constitutional:       General: Rtuh Gonzalez is not in acute distress.  Cardiovascular:      Rate and Rhythm: Normal rate and regular rhythm.   Pulmonary:      Effort: Pulmonary effort is normal.      Breath sounds: No wheezing or rhonchi.   Musculoskeletal:      Right lower leg: No edema.      Left lower leg: No edema.   Lymphadenopathy:      Cervical: No cervical adenopathy.   Neurological:      Mental Status: Ruth Gonzalez is alert.         Assessment/Plan   Diagnoses and all orders for this visit:  Anxiety  Adult attention deficit hyperactivity disorder  -     amphetamine-dextroamphetamine XR (Adderall XR) 25 mg 24 hr capsule; Take 1 capsule " (25 mg) by mouth once daily in the morning. Do not crush or chew.  -     amphetamine-dextroamphetamine XR (Adderall XR) 25 mg 24 hr capsule; Take 1 capsule (25 mg) by mouth once daily in the morning. Do not crush or chew. Do not start before May 4, 2024.  -     amphetamine-dextroamphetamine XR (Adderall XR) 25 mg 24 hr capsule; Take 1 capsule (25 mg) by mouth once daily in the morning. Do not crush or chew. Do not start before Anabel 3, 2024.  Vasovagal syncope  Episodic tension-type headache, not intractable         Patient Instructions   For ADHD - continue adderall.  Medication contract filled out.     For mood/anxiety - continue lamictal, fluoxetine, and seroquel.     For syncopal episodes - likely vasovagal - if another episode we will do work up.     For tension type headache - if not improved we will do physical therapy/massotherapy or chiropractor manipulation     Follow up in 3 months.

## 2024-05-30 DIAGNOSIS — F41.9 ANXIETY: ICD-10-CM

## 2024-05-30 RX ORDER — QUETIAPINE FUMARATE 100 MG/1
100 TABLET, FILM COATED ORAL NIGHTLY
Qty: 90 TABLET | Refills: 1 | Status: SHIPPED | OUTPATIENT
Start: 2024-05-30

## 2024-07-05 ENCOUNTER — APPOINTMENT (OUTPATIENT)
Dept: PRIMARY CARE | Facility: CLINIC | Age: 54
End: 2024-07-05
Payer: COMMERCIAL

## 2024-07-27 DIAGNOSIS — Z00.00 ENCOUNTER FOR GENERAL ADULT MEDICAL EXAMINATION WITHOUT ABNORMAL FINDINGS: ICD-10-CM

## 2024-07-28 RX ORDER — ALPRAZOLAM 1 MG/1
TABLET ORAL
Qty: 30 TABLET | Refills: 1 | Status: SHIPPED | OUTPATIENT
Start: 2024-07-28

## 2024-08-12 ENCOUNTER — APPOINTMENT (OUTPATIENT)
Dept: PRIMARY CARE | Facility: CLINIC | Age: 54
End: 2024-08-12
Payer: COMMERCIAL

## 2024-08-19 ENCOUNTER — HOSPITAL ENCOUNTER (EMERGENCY)
Facility: HOSPITAL | Age: 54
Discharge: HOME | End: 2024-08-19
Attending: STUDENT IN AN ORGANIZED HEALTH CARE EDUCATION/TRAINING PROGRAM
Payer: COMMERCIAL

## 2024-08-19 ENCOUNTER — APPOINTMENT (OUTPATIENT)
Dept: RADIOLOGY | Facility: HOSPITAL | Age: 54
End: 2024-08-19
Payer: COMMERCIAL

## 2024-08-19 VITALS
WEIGHT: 180 LBS | DIASTOLIC BLOOD PRESSURE: 78 MMHG | SYSTOLIC BLOOD PRESSURE: 135 MMHG | OXYGEN SATURATION: 97 % | TEMPERATURE: 97.5 F | HEART RATE: 82 BPM | BODY MASS INDEX: 26.66 KG/M2 | HEIGHT: 69 IN | RESPIRATION RATE: 16 BRPM

## 2024-08-19 DIAGNOSIS — R07.89 CHEST WALL PAIN: Primary | ICD-10-CM

## 2024-08-19 DIAGNOSIS — R21 RASH AND NONSPECIFIC SKIN ERUPTION: ICD-10-CM

## 2024-08-19 LAB
ALBUMIN SERPL-MCNC: 4.1 G/DL (ref 3.5–5)
ALP BLD-CCNC: 98 U/L (ref 35–125)
ALT SERPL-CCNC: 12 U/L (ref 5–40)
ANION GAP SERPL CALC-SCNC: 12 MMOL/L
AST SERPL-CCNC: 15 U/L (ref 5–40)
BASOPHILS # BLD AUTO: 0.02 X10*3/UL (ref 0–0.1)
BASOPHILS NFR BLD AUTO: 0.4 %
BILIRUB SERPL-MCNC: 0.3 MG/DL (ref 0.1–1.2)
BUN SERPL-MCNC: 10 MG/DL (ref 8–25)
CALCIUM SERPL-MCNC: 9.2 MG/DL (ref 8.5–10.4)
CHLORIDE SERPL-SCNC: 104 MMOL/L (ref 97–107)
CO2 SERPL-SCNC: 22 MMOL/L (ref 24–31)
CREAT SERPL-MCNC: 0.8 MG/DL (ref 0.4–1.6)
EGFRCR SERPLBLD CKD-EPI 2021: 89 ML/MIN/1.73M*2
EOSINOPHIL # BLD AUTO: 0.21 X10*3/UL (ref 0–0.7)
EOSINOPHIL NFR BLD AUTO: 4 %
ERYTHROCYTE [DISTWIDTH] IN BLOOD BY AUTOMATED COUNT: 12.5 % (ref 11.5–14.5)
GLUCOSE SERPL-MCNC: 89 MG/DL (ref 65–99)
HCT VFR BLD AUTO: 40.6 % (ref 36–52)
HGB BLD-MCNC: 13.9 G/DL (ref 12–17.5)
IMM GRANULOCYTES # BLD AUTO: 0.01 X10*3/UL (ref 0–0.7)
IMM GRANULOCYTES NFR BLD AUTO: 0.2 % (ref 0–0.9)
LYMPHOCYTES # BLD AUTO: 1.28 X10*3/UL (ref 1.2–4.8)
LYMPHOCYTES NFR BLD AUTO: 24.1 %
MCH RBC QN AUTO: 32 PG (ref 26–34)
MCHC RBC AUTO-ENTMCNC: 34.2 G/DL (ref 32–36)
MCV RBC AUTO: 93 FL (ref 80–100)
MONOCYTES # BLD AUTO: 0.36 X10*3/UL (ref 0.1–1)
MONOCYTES NFR BLD AUTO: 6.8 %
NEUTROPHILS # BLD AUTO: 3.43 X10*3/UL (ref 1.2–7.7)
NEUTROPHILS NFR BLD AUTO: 64.5 %
NRBC BLD-RTO: 0 /100 WBCS (ref 0–0)
PLATELET # BLD AUTO: 306 X10*3/UL (ref 150–450)
POTASSIUM SERPL-SCNC: 3.9 MMOL/L (ref 3.4–5.1)
PROT SERPL-MCNC: 7.6 G/DL (ref 5.9–7.9)
RBC # BLD AUTO: 4.35 X10*6/UL (ref 4–5.9)
SARS-COV-2 RNA RESP QL NAA+PROBE: NOT DETECTED
SODIUM SERPL-SCNC: 138 MMOL/L (ref 133–145)
TROPONIN T SERPL-MCNC: <6 NG/L
TROPONIN T SERPL-MCNC: <6 NG/L
WBC # BLD AUTO: 5.3 X10*3/UL (ref 4.4–11.3)

## 2024-08-19 PROCEDURE — 71275 CT ANGIOGRAPHY CHEST: CPT | Performed by: RADIOLOGY

## 2024-08-19 PROCEDURE — 99284 EMERGENCY DEPT VISIT MOD MDM: CPT | Mod: 25

## 2024-08-19 PROCEDURE — 80053 COMPREHEN METABOLIC PANEL: CPT | Performed by: PHYSICIAN ASSISTANT

## 2024-08-19 PROCEDURE — 84484 ASSAY OF TROPONIN QUANT: CPT | Performed by: PHYSICIAN ASSISTANT

## 2024-08-19 PROCEDURE — 36415 COLL VENOUS BLD VENIPUNCTURE: CPT | Performed by: PHYSICIAN ASSISTANT

## 2024-08-19 PROCEDURE — 71275 CT ANGIOGRAPHY CHEST: CPT

## 2024-08-19 PROCEDURE — 2500000001 HC RX 250 WO HCPCS SELF ADMINISTERED DRUGS (ALT 637 FOR MEDICARE OP): Performed by: PHYSICIAN ASSISTANT

## 2024-08-19 PROCEDURE — 2500000004 HC RX 250 GENERAL PHARMACY W/ HCPCS (ALT 636 FOR OP/ED): Performed by: PHYSICIAN ASSISTANT

## 2024-08-19 PROCEDURE — 2550000001 HC RX 255 CONTRASTS: Performed by: STUDENT IN AN ORGANIZED HEALTH CARE EDUCATION/TRAINING PROGRAM

## 2024-08-19 PROCEDURE — 85025 COMPLETE CBC W/AUTO DIFF WBC: CPT | Performed by: PHYSICIAN ASSISTANT

## 2024-08-19 PROCEDURE — 96374 THER/PROPH/DIAG INJ IV PUSH: CPT | Mod: 59

## 2024-08-19 PROCEDURE — 87635 SARS-COV-2 COVID-19 AMP PRB: CPT | Performed by: PHYSICIAN ASSISTANT

## 2024-08-19 RX ORDER — VALACYCLOVIR HYDROCHLORIDE 1 G/1
1000 TABLET, FILM COATED ORAL 3 TIMES DAILY
Qty: 21 TABLET | Refills: 0 | Status: SHIPPED | OUTPATIENT
Start: 2024-08-19 | End: 2024-08-26

## 2024-08-19 RX ORDER — NAPROXEN SODIUM 220 MG/1
324 TABLET, FILM COATED ORAL ONCE
Status: COMPLETED | OUTPATIENT
Start: 2024-08-19 | End: 2024-08-19

## 2024-08-19 RX ORDER — KETOROLAC TROMETHAMINE 30 MG/ML
15 INJECTION, SOLUTION INTRAMUSCULAR; INTRAVENOUS ONCE
Status: COMPLETED | OUTPATIENT
Start: 2024-08-19 | End: 2024-08-19

## 2024-08-19 ASSESSMENT — COLUMBIA-SUICIDE SEVERITY RATING SCALE - C-SSRS
1. IN THE PAST MONTH, HAVE YOU WISHED YOU WERE DEAD OR WISHED YOU COULD GO TO SLEEP AND NOT WAKE UP?: NO
6. HAVE YOU EVER DONE ANYTHING, STARTED TO DO ANYTHING, OR PREPARED TO DO ANYTHING TO END YOUR LIFE?: NO
2. HAVE YOU ACTUALLY HAD ANY THOUGHTS OF KILLING YOURSELF?: NO

## 2024-08-19 ASSESSMENT — PAIN SCALES - GENERAL
PAINLEVEL_OUTOF10: 5 - MODERATE PAIN

## 2024-08-19 ASSESSMENT — PAIN - FUNCTIONAL ASSESSMENT: PAIN_FUNCTIONAL_ASSESSMENT: 0-10

## 2024-08-19 ASSESSMENT — PAIN DESCRIPTION - PAIN TYPE: TYPE: ACUTE PAIN

## 2024-08-19 ASSESSMENT — PAIN DESCRIPTION - LOCATION: LOCATION: CHEST

## 2024-08-19 ASSESSMENT — PAIN DESCRIPTION - DESCRIPTORS
DESCRIPTORS: STABBING
DESCRIPTORS: STABBING

## 2024-08-19 ASSESSMENT — PAIN DESCRIPTION - ORIENTATION: ORIENTATION: RIGHT

## 2024-08-19 NOTE — Clinical Note
Ruth Gonzalez was seen and treated in our emergency department on 8/19/2024.  Ruth may return to work on 08/21/2024.       If you have any questions or concerns, please don't hesitate to call.      Bambi Morales PA-C

## 2024-08-19 NOTE — TREATMENT PLAN
"The patient was seen in conjunction with the advanced practice provider, and I performed a substantive portion of the encounter. I personally saw the patient and made/approved the management plan and take complete responsibility for the patient management. I agree with the workup, evaluation, MDM, management and diagnosis of the patient stated in the advanced practice provider's note. All medical decision making was performed by me and communicated to the advanced practice provider. All laboratory studies, radiology, and EKGs were reviewed by me.     History:  Patient presents ED for right-sided lower chest wall/costal margin pain for last 4 days noted started on Friday with gradual onset of pain with intermittent radiation to right flank.  Notes symptoms have gradually gotten worse and notes history of PE on right side.  Notes pain is pressure and squeezing in nature, moderate in severity, near constant this time.  No identifiable alleviating factors/medications and no specific aggravating factors.  Notes small red bumps overlying area of pain that wraps around her thoracic region.  Notes no cough or pleuritic chest pain.  Has not appreciating fever/chills.  Notes no BLE edema or unilateral leg swelling.  Notes no recent history/Red flags for DVT/PE.  Denies any changes to bowel/bladder habits.  Denies any other significant systemic symptoms or complaints.  Denies any significant substance use.    VS:  /78   Pulse 82   Temp 36.4 °C (97.5 °F) (Temporal)   Resp 16   Ht 1.753 m (5' 9\")   Wt 81.6 kg (180 lb)   SpO2 97%   BMI 26.58 kg/m²       Physical exam:  CONST: alert, normal appearance, no acute distress, does not appear ill/toxic  HEAD: normocephalic, atraumatic  NECK: no JVD  ENT: MMM, no rhinorrhea/congestion, posterior oropharynx clear and oral secretions well controlled  EYES: PEPRL, EOMI, no scleral icterus, no nystagmus  CV: RRR, no murmurs, 2+ equal/symmetrical pulses x4, no distal/muffled " heart sounds  PULM: CTAB, no respiratory distress, not requiring supplental O2  ABD: soft, flat/non-tender/non-distended, no mass  SKIN: warm/dry, no pallor or jaundice, white scattering in linear pattern of maculopapular rash without evidence of vesicles or crusting  NEURO: A&Ox4, no facial asymmetry, no focal neuro deficits, gross strength/motor/sensation intact x4, normal gait      I personally reviewed and interpreted the following studies:  EKG is NSR 79, normal axis, no appreciable ischemia, prolonged QTc 481 and nonspecific TW findings, labs are significant for unremarkable, images are notable for unremarkable.    MDM:  Patient presented to the ED for right-sided lower thoracic pain for last 4 days. Concerning PMHx of PE, anxiety.    Per Chart Review: Nothing pertinent to this ED encounter.    Assessment/evaluation consistent with myofascial thoracic strain complicated by illness anxiety versus very mild early VZV rash (shingles). No concerning history, clinical evidence/work-up, or exam findings for the concerning differentials of ACS/MI, PE, PTX, focal multifocal PNA, costal/rib fracture. These conditions have been thoroughly evaluated and determined to be sufficiently unlikely to be the etiology of patient's presenting symptoms. After receiving an appropriate exam, clinical work-up, and necessary interventions/treatment, Patient is appropriate for discharge at this time due to no concerning symptoms or findings requiring hospitalization for stabilization or further interrogation/management and is appropriate for management of symptoms at home with recommended appropriate outpatient follow-up.  Patient was encouraged to ask any questions or for clarification of today's ED encounter.  Patient is agreeable to plan of care.    Scores: Heart 1 (1-2% MACE) and Well's 4.5    ED Course/Diagnosis:  ED Course as of 08/20/24 0608   Mon Aug 19, 2024   0944 I personally reviewed and interpreted the EKG @ 0936: NSR 79,  normal axis, no appreciable ischemia, prolonged Qtc 481, non-specific TW findings, and prior EKG reviewed without any appreciable specific/identifiable changes. [BC]      ED Course User Index  [BC] Jaden Martin MD         Diagnoses as of 08/20/24 0608   Chest wall pain   Rash and nonspecific skin eruption     1. Chest wall pain        2. Rash and nonspecific skin eruption  valACYclovir (Valtrex) 1 gram tablet

## 2024-08-19 NOTE — DISCHARGE INSTRUCTIONS
CT scan of your chest today shows no evidence of blood clot in your lungs.  I will treat you for possible shingles due to a red rash on the left side of your chest.  Please start Valtrex.  I recommend over-the-counter Tylenol ibuprofen as needed for pain relief.  Please follow-up with your primary care doctor in the following 1 to 3 days.  Return to the emergency department if you have worsening symptoms with the onset new symptoms.

## 2024-08-19 NOTE — ED PROVIDER NOTES
HPI   Chief Complaint   Patient presents with    Chest Pain     Patient states she has R sided stabbing chest pain that started Friday. States she has some wheezing but she uses an inhaler due to frequent bronchitis. Denies SOB       This is a 53-year-old female presenting to the emergency department complaints of left-sided chest wall discomfort.  Symptoms started on Friday.  Patient states she felt she was maybe wheezing on Friday.  She has noticed some intermittent shortness of breath and the chest discomfort is intermittent as well.  She has noticed red bumps on the left side of her chest wall.  No fever, chills, coughing, hemoptysis, abdominal pain, nausea, vomiting or diaphoresis.  She does have a history of PE in 2001.  She is currently not on any blood thinners.  She does have a history of generalized anxiety disorder.        Please see HPI for pertinent positive and negative ROS      Patient History   Past Medical History:   Diagnosis Date    Anxiety     Depression      Past Surgical History:   Procedure Laterality Date    BREAST SURGERY      HYSTERECTOMY      WISDOM TOOTH EXTRACTION       Family History   Problem Relation Name Age of Onset    Hypertension Mother      Other (RAD) Mother      Diabetes Mother      Depression Mother      Asthma Mother      Mental illness Mother      No Known Problems Son      Heart disease Maternal Grandmother      Stroke Maternal Grandmother      Diabetes Maternal Grandmother      Diabetes Maternal Grandfather      Breast cancer Paternal Grandmother      Cancer Paternal Grandmother      Cancer Paternal Grandfather      Lymphoma Paternal Grandfather       Social History     Tobacco Use    Smoking status: Never    Smokeless tobacco: Never   Substance Use Topics    Alcohol use: Not Currently    Drug use: Never       Physical Exam   ED Triage Vitals [08/19/24 0931]   Temperature Heart Rate Respirations BP   36.4 °C (97.5 °F) 80 16 142/82      Pulse Ox Temp Source Heart Rate  Source Patient Position   99 % Temporal -- --      BP Location FiO2 (%)     -- --       Physical Exam  GENERAL APPEARANCE: Awake and alert. No acute respiratory distress.   VITAL SIGNS: As per the nurses' triage record.  HEENT: Normocephalic, atraumatic.   NECK: Soft, nontender and supple  CHEST: Nontender to palpation. Clear to auscultation bilaterally. No rales, rhonchi, or wheezing. Symmetric rise and fall of chest wall.  Patient has a few erythematous macules on the left chest wall and dermatomal distribution.  2 raised lesions that appear to be bug bites.  HEART: Clear S1 and S2. Regular rate and rhythm. Strong and equal pulses in the extremities.  ABDOMEN: Soft, nontender, nondistended  MUSCULOSKELETAL: The calves are nontender to palpation. Full gross active range of motion. Ambulating on own with no acute difficulties  NEUROLOGICAL: Awake, alert and oriented x 3. Motor power intact in the upper and lower extremities. Sensation is intact to light touch in the upper and lower extremities. Patient answering questions appropriately.   IMMUNOLOGICAL: No lymphatic streaking noted  DERMATOLOGIC: Warm and dry without petechiae, rashes, or ecchymosis noted on visible skin.   PYSCH: Cooperative with appropriate mood and affect.    ED Course & MDM   ED Course as of 08/19/24 1200   Mon Aug 19, 2024   0944 I personally reviewed and interpreted the EKG @ 0936: NSR 79, normal axis, no appreciable ischemia, prolonged Qtc 481, non-specific TW findings, and prior EKG reviewed without any appreciable specific/identifiable changes. [BC]      ED Course User Index  [BC] Jaden Martin MD         Diagnoses as of 08/19/24 1200   Chest wall pain   Rash and nonspecific skin eruption                 No data recorded     Eugenie Coma Scale Score: 15 (08/19/24 0944 : Antonieta Barros RN)                           Medical Decision Making  Parts of this chart have been completed using voice recognition software. Please excuse any  errors of transcription.  My thought process and reason for plan has been formulated from the time that I saw the patient until the time of disposition and is not specific to one specific moment during their visit and furthermore my MDM encompasses this entire chart and not only this text box.      HPI: Detailed above.    Exam: A medically appropriate exam performed, outlined above, given the known history and presentation.    History obtained from: Patient    EKG: See my supervising physician's EKG interpretation    Medications given during visit:  Medications   aspirin chewable tablet 324 mg (324 mg oral Given 8/19/24 0946)   ketorolac (Toradol) injection 15 mg (15 mg intravenous Given 8/19/24 1050)   sodium chloride 0.9 % bolus 500 mL (0 mL intravenous Stopped 8/19/24 1120)   iohexol (OMNIPaque) 350 mg iodine/mL solution 75 mL (75 mL intravenous Given 8/19/24 1105)        Diagnostic/tests  Labs Reviewed   COMPREHENSIVE METABOLIC PANEL - Abnormal       Result Value    Glucose 89      Sodium 138      Potassium 3.9      Chloride 104      Bicarbonate 22 (*)     Urea Nitrogen 10      Creatinine 0.80      eGFR 89      Calcium 9.2      Albumin 4.1      Alkaline Phosphatase 98      Total Protein 7.6      AST 15      Bilirubin, Total 0.3      ALT 12      Anion Gap 12     SERIAL TROPONIN, INITIAL ([facility]) - Normal    Troponin T, High Sensitivity <6     SARS-COV-2 PCR - Normal    Coronavirus 2019, PCR Not Detected      Narrative:     This assay has received FDA Emergency Use Authorization (EUA) and is only authorized for the duration of time that circumstances exist to justify the authorization of the emergency use of in vitro diagnostic tests for the detection of SARS-CoV-2 virus and/or diagnosis of COVID-19 infection under section 564(b)(1) of the Act, 21 U.S.C. 360bbb-3(b)(1). This assay is an in vitro diagnostic nucleic acid amplification test for the qualitative detection of SARS-CoV-2 from nasopharyngeal specimens and  has been validated for use at [facility]. Negative results do not preclude COVID-19 infections and should not be used as the sole basis for diagnosis, treatment, or other management decisions.     CBC WITH AUTO DIFFERENTIAL    WBC 5.3      nRBC 0.0      RBC 4.35      Hemoglobin 13.9      Hematocrit 40.6      MCV 93      MCH 32.0      MCHC 34.2      RDW 12.5      Platelets 306      Neutrophils % 64.5      Immature Granulocytes %, Automated 0.2      Lymphocytes % 24.1      Monocytes % 6.8      Eosinophils % 4.0      Basophils % 0.4      Neutrophils Absolute 3.43      Immature Granulocytes Absolute, Automated 0.01      Lymphocytes Absolute 1.28      Monocytes Absolute 0.36      Eosinophils Absolute 0.21      Basophils Absolute 0.02     TROPONIN T SERIES, HIGH SENSITIVITY (0, 2 HR, 6 HR)    Narrative:     The following orders were created for panel order Troponin T Series, High Sensitivity (0, 2HR, 6HR).  Procedure                               Abnormality         Status                     ---------                               -----------         ------                     Serial Troponin, Initial...[[id number]]  Normal              Final result               Serial Troponin, 2 Hour ...[[id number]]                      In process                 Serial Troponin, 6 Hour ...[[id number]]                                                   Please view results for these tests on the individual orders.   SERIAL TROPONIN,  2 HOUR ([facility])   SERIAL TROPONIN, 6 HOUR ([facility])      CT angio chest for pulmonary embolism   Final Result   1.  No pulmonary emboli or confluent airspace disease.             MACRO:   None        Signed by: Elvin Reed 8/19/2024 11:14 AM   Dictation workstation:   [id number]           Considerations/further MDM:  Patient was seen in conjucntion with my supervising physician,  Dr. Martin. Please refer to his note.    Blood pressure 142/82, temperature 97.5 °F, heart rate 80 bpm,  respirations 16, 99% room air    Differential diagnosis includes but not limited to musculoskeletal pain versus ACS including myocardial infarction versus pulmonary embolism versus pneumothorax versus pneumonia versus shingles    CT angio chest for PE shows no emboli or confluent airspace disease.  Initial troponin T less than 6.  Heart score 2.  No leukocytosis on CBC.  Negative COVID-19.  CMP unremarkable.  Patient was treated with oral aspirin and IV Toradol.  Due to possible erythematous rash starting in a dermatomal distribution on the left side of chest wall, will treat for shingles with Valtrex.    The patient was evaluated in the emergency department and an etiology requiring emergent treatment or admission to hospital was not identified.  All labs, imaging, and diagnostic studies were reviewed by me and the patient was counseled on clinical impression, expectations, and plan.  Patient was educated to follow-up with PCP in the following 1 to 3 days.  All questions from patient were answered.  They elicited understanding and were agreeable to course of treatment.  Patient was discharged in stable condition and given strict return precautions including new or worsening symptoms.    Procedure  Procedures     Bambi Morales PA-C  08/19/24 1200

## 2024-08-20 ENCOUNTER — TELEPHONE (OUTPATIENT)
Dept: PRIMARY CARE | Facility: CLINIC | Age: 54
End: 2024-08-20
Payer: COMMERCIAL

## 2024-08-20 DIAGNOSIS — B02.9 HERPES ZOSTER WITHOUT COMPLICATION: Primary | ICD-10-CM

## 2024-08-20 RX ORDER — METHYLPREDNISOLONE 4 MG/1
TABLET ORAL
Qty: 21 TABLET | Refills: 0 | Status: SHIPPED | OUTPATIENT
Start: 2024-08-20 | End: 2024-08-27

## 2024-08-20 RX ORDER — GABAPENTIN 300 MG/1
300 CAPSULE ORAL 3 TIMES DAILY
Qty: 90 CAPSULE | Refills: 0 | Status: SHIPPED | OUTPATIENT
Start: 2024-08-20 | End: 2025-02-16

## 2024-08-20 ASSESSMENT — HEART SCORE
TROPONIN: LESS THAN OR EQUAL TO NORMAL LIMIT
HEART SCORE: 1
HISTORY: SLIGHTLY SUSPICIOUS
AGE: 45-64
ECG: NORMAL
RISK FACTORS: NO KNOWN RISK FACTORS

## 2024-08-20 NOTE — TELEPHONE ENCOUNTER
Pt is calling (exhibiting tears) for some meds for the Shingles stabbing pain: R side Abdomen from navel wrapping around to the back. Also having tingling pain in the bottoms of her feet. She did do to the ED for Chest pain yesterday and was given Valtrex. OTC: Tylenol ( not helping      Pharm:  [facility]

## 2024-08-27 NOTE — TELEPHONE ENCOUNTER
Ruth called as she is now finished with the Valtrex and the Medrol Michael. She is still taking the gabapentin TID. She still has the stabbing feeling in her chest area, RT side below her lungs, same area as before. She wanted to know if it is normal to have brain fog afterwards. She is asking if she needs to continue taking anything else or just take the gabapentin.

## 2024-08-28 ENCOUNTER — TELEPHONE (OUTPATIENT)
Dept: PRIMARY CARE | Facility: CLINIC | Age: 54
End: 2024-08-28
Payer: COMMERCIAL

## 2024-08-28 ENCOUNTER — APPOINTMENT (OUTPATIENT)
Dept: PRIMARY CARE | Facility: CLINIC | Age: 54
End: 2024-08-28
Payer: COMMERCIAL

## 2024-08-28 NOTE — TELEPHONE ENCOUNTER
Spoke with Dr. Santos and patient does not need additional medication.  Just continue gabapentin for pain.

## 2024-08-28 NOTE — TELEPHONE ENCOUNTER
RYC-Patient called stating she was offered an appointment today for her shingles but cannot make it because of her work schedule. She is stating it is very difficult for her to take time off work. She is also asking if she should continue taking the medication:    gabapentin (Neurontin) 300 mg capsule Take 1 capsule (300 mg) by mouth 3 times a day     Until it is gone. Please call to advise. PT# [phone]. Thank You.

## 2024-08-28 NOTE — TELEPHONE ENCOUNTER
Already spoke with patient and told her that if Dr. Santos felt she needed additional medication that she would hear from the pharmacy.

## 2024-08-28 NOTE — TELEPHONE ENCOUNTER
Pt was called , Pt is aware. She was read Dr. VIMAL Santos's message to continue the Gabapentin if she is still having pain. Pt states she does not need a refill at this time. She is stating she is feeling much better and the clusters are scabbing. She is stating she also was wondering if she will need another dose of the medications:    methylPREDNISolone (Medrol Dospak) 4 mg tablets () Take as directed on package.     And    valACYclovir (Valtrex) 1 gram tablet () Take 1 tablet (1,000 mg) by mouth 3 times a day for 7 days.     She is stating she is very nervous this will come back. PT # [phone]

## 2024-09-01 DIAGNOSIS — F41.9 ANXIETY: Primary | ICD-10-CM

## 2024-09-02 RX ORDER — FLUOXETINE HYDROCHLORIDE 40 MG/1
40 CAPSULE ORAL DAILY
Qty: 90 CAPSULE | Refills: 1 | Status: SHIPPED | OUTPATIENT
Start: 2024-09-02

## 2024-09-10 ENCOUNTER — OFFICE VISIT (OUTPATIENT)
Dept: PRIMARY CARE | Facility: CLINIC | Age: 54
End: 2024-09-10
Payer: COMMERCIAL

## 2024-09-10 VITALS
BODY MASS INDEX: 27.5 KG/M2 | SYSTOLIC BLOOD PRESSURE: 124 MMHG | WEIGHT: 186.2 LBS | DIASTOLIC BLOOD PRESSURE: 68 MMHG | OXYGEN SATURATION: 99 % | HEART RATE: 69 BPM

## 2024-09-10 DIAGNOSIS — F41.9 ANXIETY: Primary | ICD-10-CM

## 2024-09-10 DIAGNOSIS — F51.04 PSYCHOPHYSIOLOGICAL INSOMNIA: ICD-10-CM

## 2024-09-10 DIAGNOSIS — Z00.00 ENCOUNTER FOR GENERAL ADULT MEDICAL EXAMINATION WITHOUT ABNORMAL FINDINGS: ICD-10-CM

## 2024-09-10 DIAGNOSIS — F90.9 ADULT ATTENTION DEFICIT HYPERACTIVITY DISORDER: ICD-10-CM

## 2024-09-10 DIAGNOSIS — B02.9 HERPES ZOSTER WITHOUT COMPLICATION: ICD-10-CM

## 2024-09-10 DIAGNOSIS — F32.A ANXIETY AND DEPRESSION: ICD-10-CM

## 2024-09-10 DIAGNOSIS — F41.9 ANXIETY AND DEPRESSION: ICD-10-CM

## 2024-09-10 PROCEDURE — 1036F TOBACCO NON-USER: CPT | Performed by: FAMILY MEDICINE

## 2024-09-10 PROCEDURE — 99214 OFFICE O/P EST MOD 30 MIN: CPT | Performed by: FAMILY MEDICINE

## 2024-09-10 RX ORDER — LAMOTRIGINE 100 MG/1
100 TABLET ORAL DAILY
Qty: 90 TABLET | Refills: 1 | Status: SHIPPED | OUTPATIENT
Start: 2024-09-10

## 2024-09-10 RX ORDER — QUETIAPINE FUMARATE 100 MG/1
100 TABLET, FILM COATED ORAL NIGHTLY
Qty: 90 TABLET | Refills: 1 | Status: SHIPPED | OUTPATIENT
Start: 2024-09-10

## 2024-09-10 RX ORDER — DEXTROAMPHETAMINE SACCHARATE, AMPHETAMINE ASPARTATE MONOHYDRATE, DEXTROAMPHETAMINE SULFATE AND AMPHETAMINE SULFATE 6.25; 6.25; 6.25; 6.25 MG/1; MG/1; MG/1; MG/1
25 CAPSULE, EXTENDED RELEASE ORAL EVERY MORNING
Qty: 30 CAPSULE | Refills: 0 | Status: SHIPPED | OUTPATIENT
Start: 2024-10-13 | End: 2024-11-12

## 2024-09-10 RX ORDER — FLUOXETINE HYDROCHLORIDE 40 MG/1
40 CAPSULE ORAL DAILY
Qty: 90 CAPSULE | Refills: 1 | Status: SHIPPED | OUTPATIENT
Start: 2024-09-10

## 2024-09-10 RX ORDER — DEXTROAMPHETAMINE SACCHARATE, AMPHETAMINE ASPARTATE MONOHYDRATE, DEXTROAMPHETAMINE SULFATE AND AMPHETAMINE SULFATE 6.25; 6.25; 6.25; 6.25 MG/1; MG/1; MG/1; MG/1
25 CAPSULE, EXTENDED RELEASE ORAL EVERY MORNING
Qty: 30 CAPSULE | Refills: 0 | Status: SHIPPED | OUTPATIENT
Start: 2024-09-13 | End: 2024-10-13

## 2024-09-10 RX ORDER — DEXTROAMPHETAMINE SACCHARATE, AMPHETAMINE ASPARTATE MONOHYDRATE, DEXTROAMPHETAMINE SULFATE AND AMPHETAMINE SULFATE 6.25; 6.25; 6.25; 6.25 MG/1; MG/1; MG/1; MG/1
25 CAPSULE, EXTENDED RELEASE ORAL EVERY MORNING
Qty: 30 CAPSULE | Refills: 0 | Status: SHIPPED | OUTPATIENT
Start: 2024-11-13

## 2024-09-10 RX ORDER — QUETIAPINE FUMARATE 50 MG/1
50 TABLET, FILM COATED ORAL NIGHTLY
Qty: 90 TABLET | Refills: 1 | Status: SHIPPED | OUTPATIENT
Start: 2024-09-10

## 2024-09-10 ASSESSMENT — ENCOUNTER SYMPTOMS
DEPRESSION: 0
LOSS OF SENSATION IN FEET: 0
OCCASIONAL FEELINGS OF UNSTEADINESS: 0

## 2024-09-10 ASSESSMENT — LIFESTYLE VARIABLES
AUDIT-C TOTAL SCORE: 0
HOW OFTEN DO YOU HAVE A DRINK CONTAINING ALCOHOL: NEVER
SKIP TO QUESTIONS 9-10: 1
HOW OFTEN DO YOU HAVE SIX OR MORE DRINKS ON ONE OCCASION: NEVER
HOW MANY STANDARD DRINKS CONTAINING ALCOHOL DO YOU HAVE ON A TYPICAL DAY: PATIENT DOES NOT DRINK

## 2024-09-10 ASSESSMENT — PAIN SCALES - GENERAL: PAINLEVEL: 0-NO PAIN

## 2024-09-10 NOTE — LETTER
September 10, 2024     Patient: Ruth Gonzalez   YOB: 1970   Date of Visit: 9/10/2024       To Whom It May Concern:    Ruth Gonzalez was seen in my clinic on 9/10/2024 at 7:45 am. Please excuse Ruth for Ruth's absence from work on this day to make the appointment.    If you have any questions or concerns, please don't hesitate to call.         Sincerely,         Huseyin Santos, DO        CC: No Recipients

## 2024-09-10 NOTE — PATIENT INSTRUCTIONS
Here for follow up.     For ADHD - stable on current regimen.  3 script sent.     For anxiety - stable on current regimen    For shingles - I recommend that you get the shingrix vaccine for shingles prevention. Shingles vaccination requires a 2 shots series divided by 2 to 6 months. Please get at the pharmacy.  Please get after the 1st of the year.     Follow up in 3-4 months.

## 2024-09-10 NOTE — PROGRESS NOTES
Subjective   Patient ID: Ruth Gonzalez is a 53 y.o. adult who presents for Anxiety (3 MO F/U).    Pt is here for follow up.     Pt was in ER for chest pain.  Thought it was a PE.  Pt then developed rash - diagnosed with shingles.  Patient states rash is improved.  Pain is improved.  Patient has been fatigued.  Pt was on antiviral.  Pt is off of gabapentin.  Pt stopped recently.      ADHD:  -F/U: stable.  ADHD has been manageable.  Patient feeling brain fog since shingles.  Pt has been able to get medicine well.    -Side Effects: none  -Past medications:   -Other:    Anxiety  -F/U: Pt states mood has been stable.  Up and down.  Pt has boyfriend, looking for a house for 11 months.   Pt has been tired.  Work is still stressful.   -Symptoms have been stable    -She denies current suicidal and homicidal ideation.    -Current Treatment: lamictal, seroquel, fluoxetine, xanan sparingly  -Counseling: not currently enrolled.   -Previous treatment includes:               Anxiety             Review of Systems    Objective   /68 (BP Location: Right arm, Patient Position: Sitting)   Pulse 69   Wt 84.5 kg (186 lb 3.2 oz)   SpO2 99%   BMI 27.50 kg/m²     Physical Exam  Vitals reviewed.   Constitutional:       General: Ruth is not in acute distress.  Cardiovascular:      Rate and Rhythm: Normal rate and regular rhythm.   Pulmonary:      Effort: Pulmonary effort is normal.      Breath sounds: No wheezing or rhonchi.   Musculoskeletal:      Right lower leg: No edema.      Left lower leg: No edema.   Lymphadenopathy:      Cervical: No cervical adenopathy.   Neurological:      Mental Status: Ruth is alert.     An OARRS report was pulled up from database and I have personally reviewed the results.  I have considered the risk of abuse, dependance, addiction, and diversion.  I believe it is clinically appropriate for this patient to continue taking this medication.      Assessment/Plan   Diagnoses and all orders for  this visit:  Anxiety  Adult attention deficit hyperactivity disorder  Herpes zoster without complication

## 2024-10-07 ENCOUNTER — TELEPHONE (OUTPATIENT)
Dept: PRIMARY CARE | Facility: CLINIC | Age: 54
End: 2024-10-07
Payer: COMMERCIAL

## 2024-10-07 DIAGNOSIS — F90.9 ADULT ATTENTION DEFICIT HYPERACTIVITY DISORDER: Primary | ICD-10-CM

## 2024-10-07 NOTE — TELEPHONE ENCOUNTER
Saroj is calling stating [facility] in [facility] are are out of medication:    amphetamine-dextroamphetamine XR (Adderall XR) 25 mg 24 hr capsule Take 1 capsule (25 mg) by mouth once daily in the morning. Do not crush or chew. Do not fill before September 13, 2024.     She is stating they have been out for the past 2 months and telling her this medication is on back order. She is asking if there is an alternative comparable. Please call to advise. Saroj's# [phone].     [facility] IS OUT OF THIS MEDICATION

## 2024-10-07 NOTE — TELEPHONE ENCOUNTER
Ruth called back stating she would like to try the medication:    Adderall 20 mg IR twice a day    Pharmacy: [facility], [place].

## 2024-10-08 ENCOUNTER — TELEPHONE (OUTPATIENT)
Dept: PRIMARY CARE | Facility: CLINIC | Age: 54
End: 2024-10-08
Payer: COMMERCIAL

## 2024-10-08 RX ORDER — DEXTROAMPHETAMINE SACCHARATE, AMPHETAMINE ASPARTATE, DEXTROAMPHETAMINE SULFATE AND AMPHETAMINE SULFATE 5; 5; 5; 5 MG/1; MG/1; MG/1; MG/1
20 TABLET ORAL 2 TIMES DAILY
Qty: 60 TABLET | Refills: 0 | Status: SHIPPED | OUTPATIENT
Start: 2024-10-08 | End: 2024-11-07

## 2024-10-08 NOTE — TELEPHONE ENCOUNTER
October is breast cancer awareness month.  You are due for a screening mammogram.   Order is in the system.

## 2024-12-02 ENCOUNTER — TELEPHONE (OUTPATIENT)
Dept: PRIMARY CARE | Facility: CLINIC | Age: 54
End: 2024-12-02
Payer: COMMERCIAL

## 2024-12-02 NOTE — TELEPHONE ENCOUNTER
[facility] [phone] ext [phone] is follow up on the Medical Request form,

## 2024-12-03 NOTE — TELEPHONE ENCOUNTER
Media Information            Document Information    [facility] / HIM Consent: Authorization to Release Protected Health Information   [facility]   11/26/2024 00:00   Attached To:   Ruth Gonzalez   Source Information    KIMBERLI Black   Document History

## 2024-12-11 ENCOUNTER — TELEPHONE (OUTPATIENT)
Dept: PRIMARY CARE | Facility: CLINIC | Age: 54
End: 2024-12-11

## 2024-12-11 NOTE — TELEPHONE ENCOUNTER
Caitlin calling for update on questionnaire  sent for life insurance application for Ruth. Please call with status of request.  Thank you

## 2024-12-12 NOTE — TELEPHONE ENCOUNTER
Spoke with patient and she said this was something from her work and she had told them she did not want it. She said that they keep reaching out to her and she keeps telling them no. She asked that we destroy the paperwork. PL

## 2025-01-23 ENCOUNTER — TELEMEDICINE (OUTPATIENT)
Dept: PRIMARY CARE | Facility: CLINIC | Age: 55
End: 2025-01-23
Payer: COMMERCIAL

## 2025-01-23 ENCOUNTER — APPOINTMENT (OUTPATIENT)
Dept: PRIMARY CARE | Facility: CLINIC | Age: 55
End: 2025-01-23
Payer: COMMERCIAL

## 2025-01-23 DIAGNOSIS — F43.10 PTSD (POST-TRAUMATIC STRESS DISORDER): ICD-10-CM

## 2025-01-23 DIAGNOSIS — F41.0 PANIC ATTACKS: Primary | ICD-10-CM

## 2025-01-23 DIAGNOSIS — Z00.00 ENCOUNTER FOR GENERAL ADULT MEDICAL EXAMINATION WITHOUT ABNORMAL FINDINGS: ICD-10-CM

## 2025-01-23 PROCEDURE — 99213 OFFICE O/P EST LOW 20 MIN: CPT | Performed by: FAMILY MEDICINE

## 2025-01-23 RX ORDER — ALPRAZOLAM 1 MG/1
1 TABLET ORAL DAILY PRN
Qty: 30 TABLET | Refills: 2 | Status: SHIPPED | OUTPATIENT
Start: 2025-01-23
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
## 2025-01-24 ENCOUNTER — TELEPHONE (OUTPATIENT)
Dept: PRIMARY CARE | Facility: CLINIC | Age: 55
End: 2025-01-24
Payer: COMMERCIAL

## 2025-01-24 NOTE — TELEPHONE ENCOUNTER
Pt said you mentioned in the telehealth call about starting her on medication for the arthritis she would like to go ahead with that and have it filled at the [facility] in [place]

## 2025-01-26 ENCOUNTER — TELEPHONE (OUTPATIENT)
Dept: PRIMARY CARE | Facility: CLINIC | Age: 55
End: 2025-01-26
Payer: COMMERCIAL

## 2025-01-26 DIAGNOSIS — M19.90 ARTHRITIS: Primary | ICD-10-CM

## 2025-01-26 RX ORDER — MELOXICAM 15 MG/1
15 TABLET ORAL DAILY
Qty: 90 TABLET | Refills: 1 | Status: SHIPPED | OUTPATIENT
Start: 2025-01-26 | End: 2026-01-26

## 2025-01-27 NOTE — TELEPHONE ENCOUNTER
Please print out letter for patient in her chart for work.  Also, due to symptoms - I do recommend seeing psychiatry - her symptoms are worsening and is needing more medication, I would recommend her to start seeing a specialist. Referral in system.

## 2025-03-08 DIAGNOSIS — F41.9 ANXIETY: ICD-10-CM

## 2025-03-08 RX ORDER — QUETIAPINE FUMARATE 100 MG/1
100 TABLET, FILM COATED ORAL NIGHTLY
Qty: 90 TABLET | Refills: 1 | Status: SHIPPED | OUTPATIENT
Start: 2025-03-08

## 2025-03-21 ENCOUNTER — OFFICE VISIT (OUTPATIENT)
Dept: URGENT CARE | Age: 55
End: 2025-03-21
Payer: COMMERCIAL

## 2025-03-21 ENCOUNTER — TELEPHONE (OUTPATIENT)
Dept: PRIMARY CARE | Facility: CLINIC | Age: 55
End: 2025-03-21
Payer: COMMERCIAL

## 2025-03-21 VITALS
OXYGEN SATURATION: 98 % | DIASTOLIC BLOOD PRESSURE: 81 MMHG | RESPIRATION RATE: 16 BRPM | TEMPERATURE: 98 F | HEART RATE: 70 BPM | SYSTOLIC BLOOD PRESSURE: 121 MMHG

## 2025-03-21 DIAGNOSIS — H53.9 VISUAL DISTURBANCE OF ONE EYE: Primary | ICD-10-CM

## 2025-03-21 DIAGNOSIS — H53.8 BLURRED VISION, RIGHT EYE: ICD-10-CM

## 2025-03-21 NOTE — PATIENT INSTRUCTIONS
[address], [place] [address]  [phone]  [email or web address]    [address], [place] [address]  [phone]  [email or web address] Smartphone [employer]    [facility]  [address], [place] [address]  [phone]  [email or web address] Smart[employer]

## 2025-03-21 NOTE — TELEPHONE ENCOUNTER
PT STATES SHE HAS SOMETHING FLOATING IN HER R EYE AND ITS CAUSING A FLOATING IMAGE    BLURRED VISION    STARTED ON 03/18/25    R EYE    PT HAS BEEN USING DIFFERENT EYE DROPS AND ALSO HAS SPLASHED WATER IN EYE WITH NO RESULTS    NO BURNING OR ITCHING BUT VERY IRRITABLE      PER NURSE PT SHOULD GO TO [facility] SO THEY CAN SEE IF SOMETHING IS IN HER EYE AND FLUSH IT OUT    PT VERBALIZED UNDERSTANDING AND AGREED

## 2025-03-21 NOTE — LETTER
March 21, 2025     Patient: Ruth Gonzalez   YOB: 1970   Date of Visit: 3/21/2025       To Whom It May Concern:    It is my medical opinion that Ruth Gonzalez may return to work on 03/23/25 .    If you have any questions or concerns, please don't hesitate to call.         Sincerely,        Marina Wells, ALANIS-CNP    CC: No Recipients

## 2025-03-21 NOTE — PROGRESS NOTES
Subjective   Patient ID: Ruth Gonzalez is a 54 y.o. adult. They present today with a chief complaint of Eye Problem.    History of Present Illness  Patient presents with complaints of an eye disturbance, floaters in the right eye and blurry vision. Symptoms started on Tuesday. Reports had headache for a day but attributed it to ongoing symptoms of URI.     Past Medical History  Allergies as of 03/21/2025 - Reviewed 03/21/2025   Allergen Reaction Noted    Other Other     Zolpidem tartrate Other 10/23/2023       (Not in a hospital admission)       Past Medical History:   Diagnosis Date    Anxiety     Depression        Past Surgical History:   Procedure Laterality Date    BREAST SURGERY      HYSTERECTOMY      WISDOM TOOTH EXTRACTION          reports that Ruth has never smoked. Ruth has never used smokeless tobacco. Ruth reports that Ruth does not currently use alcohol. Ruth reports that Ruth does not use drugs.    Review of Systems  Review of Systems   Eyes:  Positive for visual disturbance.   All other systems reviewed and are negative.                                 Objective    Vitals:    03/21/25 1332   BP: 121/81   BP Location: Left arm   Patient Position: Sitting   BP Cuff Size: Adult   Pulse: 70   Resp: 16   Temp: 36.7 °C (98 °F)   TempSrc: Temporal   SpO2: 98%     No LMP recorded.    Physical Exam  Vitals reviewed.   General: Vitals Noted. No distress. Normocephalic.  Cardiovascular:     Heart sounds: Normal heart sounds, S1 normal and S2 normal. No murmur heard.     No friction rub.   Pulmonary:      Effort: Pulmonary effort is normal.      Breath sounds:  Lungs clear to auscultation bilaterally   HEENT: Left and right TM is within normal limits with adequately infection visible landmarks. No drainage.  EOMI, normal conjunctiva, normal red reflexes bilaterally, normal peripheral visual field exam.Normal OP. No maxillary and frontal sinus tenderness on palpation is present. Pharynx  and tonsils are not hyperemic, and without exudate.   Neck: Supple with no adenopathy.  Lymphadenopathy:   No cervical adenopathy on palpation  Lower Body: No right inguinal adenopathy. No left inguinal adenopathy.   Abdominal:      Palpations: There is no hepatomegaly, splenomegaly or mass. Abdomen is soft, non-tender, and non-distended. No suprapubic tenderness. No CVA tenderness.   Skin:     Comments: no rash   Neurological:      Cranial Nerves: Cranial nerves 2-12 are intact.      Sensory: No sensory deficit.      Motor: Motor function is intact.      Deep Tendon Reflexes: Reflexes are normal and symmetric.       Procedures    Point of Care Test & Imaging Results from this visit  No results found for this visit on 03/21/25.   No results found.    Diagnostic study results (if any) were reviewed by REYES Hearn.    Assessment/Plan   Allergies, medications, history, and pertinent labs/EKGs/Imaging reviewed by REYES Hearn.     Medical Decision Making  Concern for visual deficit. No signs of periorbital or orbital cellulitis, no periorbital swelling, No neurological changes in examination, No foreign body visualized, no drainage, no erythema to conjunctiva. Red reflex is present bilaterally, Good pupil reaction bilaterally, normal peripheral visual field exam. Patient was advised to follow up with Ophthalmology for further evaluation and management as soon as possible, or to proceed to ER if symptoms ongoing or worsen. Agreed with the plan.     Orders and Diagnoses  Diagnoses and all orders for this visit:  Visual disturbance of one eye  -     Referral to Ophthalmology; Future  Blurred vision, right eye  -     Referral to Ophthalmology; Future      Medical Admin Record      Patient disposition: Home    Electronically signed by REYES Hearn  1:45 PM

## 2025-04-28 DIAGNOSIS — F90.9 ADULT ATTENTION DEFICIT HYPERACTIVITY DISORDER: ICD-10-CM

## 2025-04-29 RX ORDER — DEXTROAMPHETAMINE SACCHARATE, AMPHETAMINE ASPARTATE MONOHYDRATE, DEXTROAMPHETAMINE SULFATE AND AMPHETAMINE SULFATE 6.25; 6.25; 6.25; 6.25 MG/1; MG/1; MG/1; MG/1
25 CAPSULE, EXTENDED RELEASE ORAL
Qty: 30 CAPSULE | Refills: 0 | Status: SHIPPED | OUTPATIENT
Start: 2025-04-29

## 2025-05-10 DIAGNOSIS — F51.04 PSYCHOPHYSIOLOGICAL INSOMNIA: ICD-10-CM

## 2025-05-10 RX ORDER — QUETIAPINE FUMARATE 50 MG/1
50 TABLET, FILM COATED ORAL NIGHTLY
Qty: 90 TABLET | Refills: 1 | Status: SHIPPED | OUTPATIENT
Start: 2025-05-10

## 2025-06-05 ENCOUNTER — TELEPHONE (OUTPATIENT)
Dept: PRIMARY CARE | Facility: CLINIC | Age: 55
End: 2025-06-05
Payer: COMMERCIAL

## 2025-06-05 DIAGNOSIS — F41.9 ANXIETY: ICD-10-CM

## 2025-06-05 RX ORDER — FLUOXETINE HYDROCHLORIDE 40 MG/1
40 CAPSULE ORAL DAILY
Qty: 90 CAPSULE | Refills: 1 | Status: SHIPPED | OUTPATIENT
Start: 2025-06-05

## 2025-06-05 NOTE — TELEPHONE ENCOUNTER
Pt is calling to r/s her appt (due to moving) 6/16/25 PE to next available 10/13/25 at  7:15 am and on on the wait list, she is concerned about being able to get her refill meds

## 2025-06-16 ENCOUNTER — APPOINTMENT (OUTPATIENT)
Dept: PRIMARY CARE | Facility: CLINIC | Age: 55
End: 2025-06-16
Payer: COMMERCIAL

## 2025-06-23 DIAGNOSIS — F90.9 ADULT ATTENTION DEFICIT HYPERACTIVITY DISORDER: ICD-10-CM

## 2025-06-23 RX ORDER — DEXTROAMPHETAMINE SACCHARATE, AMPHETAMINE ASPARTATE MONOHYDRATE, DEXTROAMPHETAMINE SULFATE AND AMPHETAMINE SULFATE 6.25; 6.25; 6.25; 6.25 MG/1; MG/1; MG/1; MG/1
25 CAPSULE, EXTENDED RELEASE ORAL
Qty: 30 CAPSULE | Refills: 0 | Status: SHIPPED | OUTPATIENT
Start: 2025-06-23

## 2025-06-30 DIAGNOSIS — F32.A ANXIETY AND DEPRESSION: ICD-10-CM

## 2025-06-30 DIAGNOSIS — F41.9 ANXIETY AND DEPRESSION: ICD-10-CM

## 2025-07-01 RX ORDER — LAMOTRIGINE 100 MG/1
TABLET ORAL DAILY
Qty: 90 TABLET | Refills: 1 | Status: SHIPPED | OUTPATIENT
Start: 2025-07-01

## 2025-07-08 ENCOUNTER — TELEPHONE (OUTPATIENT)
Dept: PRIMARY CARE | Facility: CLINIC | Age: 55
End: 2025-07-08
Payer: COMMERCIAL

## 2025-07-08 DIAGNOSIS — Z12.31 ENCOUNTER FOR SCREENING MAMMOGRAM FOR BREAST CANCER: ICD-10-CM

## 2025-07-08 NOTE — TELEPHONE ENCOUNTER
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

## 2025-07-09 ENCOUNTER — PATIENT MESSAGE (OUTPATIENT)
Dept: PRIMARY CARE | Facility: CLINIC | Age: 55
End: 2025-07-09
Payer: COMMERCIAL

## 2025-07-09 DIAGNOSIS — N39.0 ACUTE URINARY TRACT INFECTION: Primary | ICD-10-CM

## 2025-07-09 RX ORDER — CEPHALEXIN 500 MG/1
500 CAPSULE ORAL 2 TIMES DAILY
Qty: 14 CAPSULE | Refills: 0 | Status: SHIPPED | OUTPATIENT
Start: 2025-07-09 | End: 2025-07-16

## 2025-07-09 NOTE — TELEPHONE ENCOUNTER
FYI Pt called about having urinary issues her back and side is hurting she did take a AZO urinary relief last night and it not seeming to work she is very nauseous.  pt does have an appt on Monday due to pcp not having any sooner appts she was informed by  staff to go to  to get looked at

## 2025-07-14 ENCOUNTER — TELEMEDICINE (OUTPATIENT)
Dept: PRIMARY CARE | Facility: CLINIC | Age: 55
End: 2025-07-14
Payer: COMMERCIAL

## 2025-07-14 DIAGNOSIS — R05.8 POST-VIRAL COUGH SYNDROME: ICD-10-CM

## 2025-07-14 DIAGNOSIS — F51.04 PSYCHOPHYSIOLOGICAL INSOMNIA: ICD-10-CM

## 2025-07-14 DIAGNOSIS — F90.9 ADULT ATTENTION DEFICIT HYPERACTIVITY DISORDER: ICD-10-CM

## 2025-07-14 DIAGNOSIS — M19.90 ARTHRITIS: ICD-10-CM

## 2025-07-14 DIAGNOSIS — F32.A ANXIETY AND DEPRESSION: ICD-10-CM

## 2025-07-14 DIAGNOSIS — F41.9 ANXIETY AND DEPRESSION: ICD-10-CM

## 2025-07-14 DIAGNOSIS — Z00.00 ENCOUNTER FOR GENERAL ADULT MEDICAL EXAMINATION WITHOUT ABNORMAL FINDINGS: ICD-10-CM

## 2025-07-14 DIAGNOSIS — F41.9 ANXIETY: ICD-10-CM

## 2025-07-14 PROCEDURE — 1036F TOBACCO NON-USER: CPT | Performed by: FAMILY MEDICINE

## 2025-07-14 PROCEDURE — 99213 OFFICE O/P EST LOW 20 MIN: CPT | Performed by: FAMILY MEDICINE

## 2025-07-14 NOTE — PROGRESS NOTES
Subjective   Patient ID: Ruth Gonzalez is a 54 y.o. adult who presents for No chief complaint on file..    Virtual or Telephone Consent    An interactive audio and video telecommunication system which permits real time communications between the patient (at the originating site) and provider (at the distant site) was utilized to provide this telehealth service.   Verbal consent was requested and obtained from Ruth Gonzalez on this date, 07/14/25 for a telehealth visit and the patient's location was confirmed at the time of the visit.    Pt is middle of moving.  Dog ended up in vet ER (seizure).  Pt developed UTI.  Gets keys on August 1st.  Will be moving to [place].  Apartment started to accept [address].       ADHD:  -F/U: stable.  Adderall xr 25mg.  Tolerating well.  Lasting most of the day.  Not affecting sleep.  Not affecting diet.   -Side Effects: none  -Past medications:   -Other:     Anxiety  -F/U: Pt states mood has been up and down.  Pt was able to purchase a new house in [place].  Stress of packing and moving out.  Using seroquel at night.  Patient has needed xanax.  Using 2-3 times a week.    -Symptoms have been stable    -She denies current suicidal and homicidal ideation.    -Current Treatment: lamictal, seroquel, fluoxetine, xanan sparingly  -Counseling: not currently enrolled.   -Previous treatment includes:         Review of Systems    Objective   There were no vitals taken for this visit.    Physical Exam  Neurological:      Mental Status: Ruth is alert.   Psychiatric:         Mood and Affect: Mood normal.     An OARRS report was pulled up from database and I have personally reviewed the results.  I have considered the risk of abuse, dependance, addiction, and diversion.  I believe it is clinically appropriate for this patient to continue taking this medication.      Assessment/Plan   Diagnoses and all orders for this visit:  Psychophysiological insomnia  Post-viral cough  syndrome  Encounter for general adult medical examination without abnormal findings  Arthritis  Anxiety and depression  Anxiety  Adult attention deficit hyperactivity disorder    There are no Patient Instructions on file for this visit.

## 2025-07-15 RX ORDER — QUETIAPINE FUMARATE 50 MG/1
50 TABLET, FILM COATED ORAL NIGHTLY
Qty: 90 TABLET | Refills: 1 | Status: SHIPPED | OUTPATIENT
Start: 2025-07-15

## 2025-07-15 RX ORDER — FLUOXETINE HYDROCHLORIDE 40 MG/1
40 CAPSULE ORAL DAILY
Qty: 90 CAPSULE | Refills: 1 | Status: SHIPPED | OUTPATIENT
Start: 2025-07-15

## 2025-07-15 RX ORDER — DEXTROAMPHETAMINE SACCHARATE, AMPHETAMINE ASPARTATE MONOHYDRATE, DEXTROAMPHETAMINE SULFATE AND AMPHETAMINE SULFATE 6.25; 6.25; 6.25; 6.25 MG/1; MG/1; MG/1; MG/1
25 CAPSULE, EXTENDED RELEASE ORAL EVERY MORNING
Qty: 30 CAPSULE | Refills: 0 | Status: SHIPPED | OUTPATIENT
Start: 2025-07-15 | End: 2025-08-14

## 2025-07-15 RX ORDER — MELOXICAM 15 MG/1
15 TABLET ORAL DAILY
Qty: 90 TABLET | Refills: 1 | Status: SHIPPED | OUTPATIENT
Start: 2025-07-15 | End: 2026-07-15

## 2025-07-15 RX ORDER — QUETIAPINE FUMARATE 100 MG/1
100 TABLET, FILM COATED ORAL NIGHTLY
Qty: 90 TABLET | Refills: 1 | Status: SHIPPED | OUTPATIENT
Start: 2025-07-15

## 2025-07-15 RX ORDER — ALPRAZOLAM 1 MG/1
1 TABLET ORAL DAILY PRN
Qty: 30 TABLET | Refills: 2 | Status: SHIPPED | OUTPATIENT
Start: 2025-07-15

## 2025-07-15 RX ORDER — LAMOTRIGINE 100 MG/1
100 TABLET ORAL DAILY
Qty: 90 TABLET | Refills: 1 | Status: SHIPPED | OUTPATIENT
Start: 2025-07-15

## 2025-07-15 RX ORDER — ESTRADIOL 2 MG/1
2 TABLET ORAL DAILY
Qty: 90 TABLET | Refills: 3 | Status: SHIPPED | OUTPATIENT
Start: 2025-07-15